# Patient Record
Sex: FEMALE | Race: WHITE | NOT HISPANIC OR LATINO | Employment: OTHER | URBAN - METROPOLITAN AREA
[De-identification: names, ages, dates, MRNs, and addresses within clinical notes are randomized per-mention and may not be internally consistent; named-entity substitution may affect disease eponyms.]

---

## 2017-01-03 ENCOUNTER — TRANSCRIBE ORDERS (OUTPATIENT)
Dept: ADMINISTRATIVE | Facility: HOSPITAL | Age: 72
End: 2017-01-03

## 2017-01-03 DIAGNOSIS — K57.92 ACUTE DIVERTICULITIS: Primary | ICD-10-CM

## 2017-01-05 ENCOUNTER — HOSPITAL ENCOUNTER (OUTPATIENT)
Dept: CT IMAGING | Facility: CLINIC | Age: 72
Discharge: HOME | End: 2017-01-05
Payer: MEDICARE

## 2017-01-05 DIAGNOSIS — K57.92 ACUTE DIVERTICULITIS: ICD-10-CM

## 2017-01-05 PROCEDURE — 74177 CT ABD & PELVIS W/CONTRAST: CPT

## 2017-01-05 RX ADMIN — IOHEXOL 100 ML: 350 INJECTION, SOLUTION INTRAVENOUS at 08:37

## 2017-05-11 ENCOUNTER — APPOINTMENT (OUTPATIENT)
Dept: PHYSICAL THERAPY | Facility: CLINIC | Age: 72
End: 2017-05-11
Payer: MEDICARE

## 2017-05-11 PROCEDURE — 97110 THERAPEUTIC EXERCISES: CPT

## 2017-05-11 PROCEDURE — G8991 OTHER PT/OT GOAL STATUS: HCPCS

## 2017-05-11 PROCEDURE — G8990 OTHER PT/OT CURRENT STATUS: HCPCS

## 2017-05-11 PROCEDURE — 97162 PT EVAL MOD COMPLEX 30 MIN: CPT

## 2017-05-15 ENCOUNTER — APPOINTMENT (OUTPATIENT)
Dept: PHYSICAL THERAPY | Facility: CLINIC | Age: 72
End: 2017-05-15
Payer: MEDICARE

## 2017-05-15 PROCEDURE — 97140 MANUAL THERAPY 1/> REGIONS: CPT

## 2017-05-15 PROCEDURE — 97010 HOT OR COLD PACKS THERAPY: CPT

## 2017-05-18 ENCOUNTER — TRANSCRIBE ORDERS (OUTPATIENT)
Dept: ADMINISTRATIVE | Facility: HOSPITAL | Age: 72
End: 2017-05-18

## 2017-05-18 ENCOUNTER — APPOINTMENT (OUTPATIENT)
Dept: PHYSICAL THERAPY | Facility: CLINIC | Age: 72
End: 2017-05-18
Payer: MEDICARE

## 2017-05-18 DIAGNOSIS — M85.80 OSTEOPENIA, UNSPECIFIED LOCATION: ICD-10-CM

## 2017-05-18 DIAGNOSIS — Z12.31 ENCOUNTER FOR MAMMOGRAM TO ESTABLISH BASELINE MAMMOGRAM: Primary | ICD-10-CM

## 2017-05-19 ENCOUNTER — HOSPITAL ENCOUNTER (OUTPATIENT)
Dept: MAMMOGRAPHY | Facility: CLINIC | Age: 72
Discharge: HOME | End: 2017-05-19
Payer: MEDICARE

## 2017-05-19 DIAGNOSIS — M85.80 OSTEOPENIA, UNSPECIFIED LOCATION: ICD-10-CM

## 2017-05-19 DIAGNOSIS — Z12.31 ENCOUNTER FOR MAMMOGRAM TO ESTABLISH BASELINE MAMMOGRAM: ICD-10-CM

## 2017-05-19 PROCEDURE — G0202 SCR MAMMO BI INCL CAD: HCPCS

## 2017-05-19 PROCEDURE — 77080 DXA BONE DENSITY AXIAL: CPT

## 2017-05-19 PROCEDURE — 77063 BREAST TOMOSYNTHESIS BI: CPT

## 2017-05-22 ENCOUNTER — APPOINTMENT (OUTPATIENT)
Dept: PHYSICAL THERAPY | Facility: CLINIC | Age: 72
End: 2017-05-22
Payer: MEDICARE

## 2017-05-22 PROCEDURE — 97140 MANUAL THERAPY 1/> REGIONS: CPT

## 2017-05-24 ENCOUNTER — APPOINTMENT (OUTPATIENT)
Dept: PHYSICAL THERAPY | Facility: CLINIC | Age: 72
End: 2017-05-24
Payer: MEDICARE

## 2017-05-31 ENCOUNTER — APPOINTMENT (OUTPATIENT)
Dept: PHYSICAL THERAPY | Facility: CLINIC | Age: 72
End: 2017-05-31
Payer: MEDICARE

## 2018-06-11 ENCOUNTER — TRANSCRIBE ORDERS (OUTPATIENT)
Dept: ADMINISTRATIVE | Facility: HOSPITAL | Age: 73
End: 2018-06-11

## 2018-06-11 DIAGNOSIS — Z12.39 SCREENING BREAST EXAMINATION: Primary | ICD-10-CM

## 2018-06-26 ENCOUNTER — HOSPITAL ENCOUNTER (OUTPATIENT)
Dept: MAMMOGRAPHY | Facility: CLINIC | Age: 73
Discharge: HOME | End: 2018-06-26
Payer: MEDICARE

## 2018-06-26 DIAGNOSIS — Z12.39 SCREENING BREAST EXAMINATION: ICD-10-CM

## 2018-06-26 PROCEDURE — 77063 BREAST TOMOSYNTHESIS BI: CPT

## 2018-06-26 PROCEDURE — 77067 SCR MAMMO BI INCL CAD: CPT

## 2018-09-18 ENCOUNTER — TRANSCRIBE ORDERS (OUTPATIENT)
Dept: ADMINISTRATIVE | Facility: HOSPITAL | Age: 73
End: 2018-09-18

## 2018-09-18 DIAGNOSIS — M48.061 SPINAL STENOSIS OF LUMBAR REGION, UNSPECIFIED WHETHER NEUROGENIC CLAUDICATION PRESENT: Primary | ICD-10-CM

## 2018-10-03 ENCOUNTER — HOSPITAL ENCOUNTER (OUTPATIENT)
Dept: RADIOLOGY | Facility: HOSPITAL | Age: 73
Discharge: HOME | End: 2018-10-03
Payer: MEDICARE

## 2018-10-03 ENCOUNTER — HOSPITAL ENCOUNTER (OUTPATIENT)
Dept: MRI IMAGING | Facility: HOSPITAL | Age: 73
Discharge: HOME | End: 2018-10-03
Payer: MEDICARE

## 2018-10-03 DIAGNOSIS — M48.061 SPINAL STENOSIS OF LUMBAR REGION, UNSPECIFIED WHETHER NEUROGENIC CLAUDICATION PRESENT: ICD-10-CM

## 2018-10-03 PROCEDURE — 72148 MRI LUMBAR SPINE W/O DYE: CPT

## 2018-10-03 PROCEDURE — 72114 X-RAY EXAM L-S SPINE BENDING: CPT

## 2019-05-21 ENCOUNTER — TRANSCRIBE ORDERS (OUTPATIENT)
Dept: ADMINISTRATIVE | Facility: HOSPITAL | Age: 74
End: 2019-05-21

## 2019-05-21 ENCOUNTER — APPOINTMENT (OUTPATIENT)
Dept: RADIOLOGY | Facility: CLINIC | Age: 74
End: 2019-05-21
Payer: MEDICARE

## 2019-05-21 DIAGNOSIS — M48.061 SPINAL STENOSIS OF LUMBAR REGION, UNSPECIFIED WHETHER NEUROGENIC CLAUDICATION PRESENT: Primary | ICD-10-CM

## 2019-05-21 DIAGNOSIS — M48.061 SPINAL STENOSIS OF LUMBAR REGION, UNSPECIFIED WHETHER NEUROGENIC CLAUDICATION PRESENT: ICD-10-CM

## 2019-05-21 PROCEDURE — 72114 X-RAY EXAM L-S SPINE BENDING: CPT

## 2019-06-13 ENCOUNTER — TRANSCRIBE ORDERS (OUTPATIENT)
Dept: ADMINISTRATIVE | Facility: HOSPITAL | Age: 74
End: 2019-06-13

## 2019-06-13 DIAGNOSIS — Z12.31 ENCOUNTER FOR SCREENING MAMMOGRAM FOR MALIGNANT NEOPLASM OF BREAST: Primary | ICD-10-CM

## 2019-06-27 ENCOUNTER — HOSPITAL ENCOUNTER (OUTPATIENT)
Dept: MAMMOGRAPHY | Facility: CLINIC | Age: 74
Discharge: HOME | End: 2019-06-27
Payer: MEDICARE

## 2019-06-27 VITALS — BODY MASS INDEX: 23.05 KG/M2 | WEIGHT: 135 LBS | HEIGHT: 64 IN

## 2019-06-27 DIAGNOSIS — Z12.31 ENCOUNTER FOR SCREENING MAMMOGRAM FOR MALIGNANT NEOPLASM OF BREAST: ICD-10-CM

## 2019-06-27 PROCEDURE — 77063 BREAST TOMOSYNTHESIS BI: CPT

## 2019-06-27 PROCEDURE — 77067 SCR MAMMO BI INCL CAD: CPT

## 2019-07-11 ENCOUNTER — TRANSCRIBE ORDERS (OUTPATIENT)
Dept: ADMINISTRATIVE | Facility: HOSPITAL | Age: 74
End: 2019-07-11

## 2019-07-11 DIAGNOSIS — M47.816 LUMBAR SPONDYLOSIS: Primary | ICD-10-CM

## 2019-08-12 ENCOUNTER — EVALUATION (OUTPATIENT)
Dept: PHYSICAL THERAPY | Facility: CLINIC | Age: 74
End: 2019-08-12
Payer: MEDICARE

## 2019-08-12 ENCOUNTER — TRANSCRIBE ORDERS (OUTPATIENT)
Dept: PHYSICAL THERAPY | Facility: CLINIC | Age: 74
End: 2019-08-12

## 2019-08-12 DIAGNOSIS — M47.816 LUMBAR SPONDYLOSIS: Primary | ICD-10-CM

## 2019-08-12 PROCEDURE — 97161 PT EVAL LOW COMPLEX 20 MIN: CPT | Performed by: PHYSICAL THERAPIST

## 2019-08-12 PROCEDURE — 97110 THERAPEUTIC EXERCISES: CPT | Performed by: PHYSICAL THERAPIST

## 2019-08-12 RX ORDER — OMEPRAZOLE 40 MG/1
40 CAPSULE, DELAYED RELEASE ORAL DAILY
COMMUNITY
End: 2020-06-02

## 2019-08-12 RX ORDER — ALPRAZOLAM 1 MG/1
1 TABLET ORAL
COMMUNITY

## 2019-08-12 RX ORDER — OXYCODONE AND ACETAMINOPHEN 10; 325 MG/1; MG/1
1 TABLET ORAL EVERY 4 HOURS PRN
COMMUNITY
End: 2020-05-13

## 2019-08-12 RX ORDER — METOPROLOL SUCCINATE 25 MG/1
25 TABLET, EXTENDED RELEASE ORAL DAILY
COMMUNITY

## 2019-08-12 RX ORDER — DULOXETIN HYDROCHLORIDE 30 MG/1
30 CAPSULE, DELAYED RELEASE ORAL DAILY
COMMUNITY
End: 2020-05-13

## 2019-08-12 NOTE — LETTER
2019    Venus Shanks MD  [address],[place] [address]  [address] [place] [address]    Patient: Sheron Verma   YOB: 1945   Date of Visit: 2019     Encounter Diagnosis     ICD-10-CM    1  Lumbar spondylosis M47 816        Dear Dr Greg Mendoza: Thank you for your recent referral of Sheron Verma  Please review the attached evaluation summary from Ana's recent visit  Please verify that you agree with the plan of care by signing the attached order  If you have any questions or concerns, please do not hesitate to call  I sincerely appreciate the opportunity to share in the care of one of your patients and hope to have another opportunity to work with you in the near future  Sincerely,    Sofya Robles, PT      Referring Provider:      I certify that I have read the below Plan of Care and certify the need for these services furnished under this plan of treatment while under my care  Venus Shanks MD  [address],[place] [address]  [address] [place] [address]  VIA Facsimile: [phone]          PT Evaluation     Today's date: 2019  Patient name: Sheron Verma  : 1945  MRN: [id number]  Referring provider: Pablo Iyer MD  Dx:   Encounter Diagnosis     ICD-10-CM    1  Lumbar spondylosis M47 816                   Assessment  Assessment details: Patient is a 75 y o  female presenting to outpatient physical therapy with chief complaints of lower back pain with (L) radicular symptoms  No further referral appears necessary at this time based upon examination results  Patient describes chronic lower back pain for over 10 years with failed conservative and surgical treatments   Patient displays with abnormal gait, abnormal posture, lumbar ROM restrictions, (+) (L) Slump/ SLR, (L) L4-5 myotomal weakness, extension bias in unweighted positions, and primary movement diagnosis of lumbar hypomobility resulting in pathanatomical signs and symptoms consistent with lumbar spondylosis and functional restrictions  Skilled PT is required to decrease pain and improve strength and ROM to allow patient to return to desired level of function with walking  Please contact me if you have any questions  Thank you for the opportunity to share in the care of this patient  Impairments: abnormal gait, abnormal muscle tone, abnormal or restricted ROM, abnormal movement, activity intolerance, impaired physical strength, lacks appropriate home exercise program, pain with function, poor posture  and poor body mechanics    Symptom irritability: highUnderstanding of Dx/Px/POC: good   Prognosis: fair  Prognosis details: Positive prognostic indicators include: positive attitude toward recovery, motivated to improve  Negative prognostic indicators include chronicity of symptoms  Goals  ST  Decrease pain to 7/10 max in 3 weeks  2  Increase Lumbar AROM: minimal restriction all planes in 3 weeks  3  Increase (B) LE strength by 1/2 MMT grade in 3 weeks  4  I with HEP in 3 weeks  5  Improve FOTO score to 50 in 3 weeks  LT  Decrease pain to 3/10 max in 6 weeks  2  Increase Lumbar AROM: unrestricted all planes in 6 weeks  3  Increase (B) LE strength to 4+/5 all planes in 6 weeks  4  I with HEP in 6 weeks  5  Improve FOTO score to 54 in 6 weeks  Plan  Plan details: Prognosis above is given PT services 2x/week tapering to 1x/week over the next 6 weeks and home program adherence       Patient would benefit from: skilled physical therapy  Planned modality interventions: cryotherapy and thermotherapy: hydrocollator packs  Planned therapy interventions: activity modification, joint mobilization, manual therapy, neuromuscular re-education, patient education, postural training, strengthening, stretching, therapeutic exercise, therapeutic activities, functional ROM exercises, home exercise program, gait training and body mechanics training  Plan of Care beginning date: 2019  Plan of Care expiration date: 2019  Treatment plan discussed with: patient        Subjective Evaluation    History of Present Illness  Mechanism of injury: Patient reports chronic lower back pain with (L) sided radicular symptoms to the (L) ankle  She reports receiving treatments including: physical therapy, injections, chiropractic care, traction, acupuncture, MILD procedure, and lumbar laminectomy surgery  She reports no relief in symptoms with these treatments  She reports in 2018 she had the MILD procedure - referred to neurosurgeon  Neurosurgeon madi Valdivia evaluation and treatment  Greatest concern: won't be able to walk  Quality of life: good    Pain  Current pain ratin  At best pain ratin  At worst pain rating: 10  Location: Lower Lumbar Spine - radiating down (L) LE to ankle   Quality: burning, sharp, radiating and dull ache    Social Support  Lives in: multiple-level home  Lives with: spouse    Employment status: not working  Treatments  Previous treatment: chiropractic, physical therapy, medication and injection treatment  Patient Goals  Patient goal: Return to active lifestyle - walking, decrease pain        Objective     Concurrent Complaints  Positive for history of cancer  Negative for night pain, disturbed sleep, bladder dysfunction, bowel dysfunction, saddle (S4) numbness and history of trauma    Static Posture   General Observations  Symmetrical weight bearing  Lumbar Spine   Flattened       Postural Observations  Seated posture: fair  Standing posture: fair        Neurological Testing     Sensation     Lumbar   Left   Intact: light touch    Right   Intact: light touch    Reflexes   Left   Patellar (L4): normal (2+)  Achilles (S1): normal (2+)    Right   Patellar (L4): normal (2+)  Achilles (S1): normal (2+)    Active Range of Motion     Lumbar   Flexion:  Restriction level: moderate  Extension:  Restriction level: moderate    Additional Active Range of Motion Details  (B) SG: moderate restriction - no pain reproduction     Strength/Myotome Testing     Left Hip   Planes of Motion   Flexion: 4+    Right Hip   Planes of Motion   Flexion: 4+    Left Knee   Flexion: 5  Extension: 5    Right Knee   Flexion: 5  Extension: 5    Left Ankle/Foot   Dorsiflexion: 4  Plantar flexion: 4+  Great toe extension: 4    Right Ankle/Foot   Dorsiflexion: 5  Plantar flexion: 5  Great toe extension: 4+    Tests     Lumbar     Left   Positive passive SLR and slump test    Negative crossed SLR  Right   Negative crossed SLR, passive SLR and slump test      Additional Tests Details  Repeated Movement Testing:   Repeated Flexion in Standing: Increased, Worse  Repeated Extension in Standing: Peripheralizing, Worse  Repeated (R) SG in Standing: NE    Static Position Testing:   Hooklying position: NE  PPT with Hooklying position: Decreased pain  Prone on elbows: Decreased lumbar pain - NE on (L) LE nerve symptoms        Ambulation     Observational Gait   Decreased walking speed     Left foot contact pattern: foot flat  Right foot contact pattern: foot flat  Left arm swing: decreased  Right arm swing: decreased  Base of support: normal    Additional Observational Gait Details  Minimal hip motion with walking         Daily Treatment Diary     DX: Lumbar Spondylosis  EPOC: 9/23/19  Precautions: standard   CO-MORBIDITES: NA  PERSONAL FACTORS:    Manual           Lumbar STM                                                      Exercise Diary  HEP         Prone on Elbows 8/12                   Sciatic Nerve Slider - Seated 8/12                   Recumbent Bike                    Abdominal Bracing 8/12         LFS: Alt UE          LFS: (B) UE          LFS: Alt LE          LFS: [place] UE/LE                    S/L Clamshells 8/12         Bridge with ME Hip ABD                                                                                    Modalities

## 2019-08-12 NOTE — PROGRESS NOTES
PT Evaluation     Today's date: 2019  Patient name: Rashmi Parra  : 1945  MRN: [id number]  Referring provider: Evelyn Lopez MD  Dx:   Encounter Diagnosis     ICD-10-CM    1  Lumbar spondylosis M47 816                   Assessment  Assessment details: Patient is a 75 y o  female presenting to outpatient physical therapy with chief complaints of lower back pain with (L) radicular symptoms  No further referral appears necessary at this time based upon examination results  Patient describes chronic lower back pain for over 10 years with failed conservative and surgical treatments  Patient displays with abnormal gait, abnormal posture, lumbar ROM restrictions, (+) (L) Slump/ SLR, (L) L4-5 myotomal weakness, extension bias in unweighted positions, and primary movement diagnosis of lumbar hypomobility resulting in pathanatomical signs and symptoms consistent with lumbar spondylosis and functional restrictions  Skilled PT is required to decrease pain and improve strength and ROM to allow patient to return to desired level of function with walking  Please contact me if you have any questions  Thank you for the opportunity to share in the care of this patient  Impairments: abnormal gait, abnormal muscle tone, abnormal or restricted ROM, abnormal movement, activity intolerance, impaired physical strength, lacks appropriate home exercise program, pain with function, poor posture  and poor body mechanics    Symptom irritability: highUnderstanding of Dx/Px/POC: good   Prognosis: fair  Prognosis details: Positive prognostic indicators include: positive attitude toward recovery, motivated to improve  Negative prognostic indicators include chronicity of symptoms  Goals  ST  Decrease pain to 7/10 max in 3 weeks  2  Increase Lumbar AROM: minimal restriction all planes in 3 weeks  3  Increase (B) LE strength by 1/2 MMT grade in 3 weeks  4  I with HEP in 3 weeks    5  Improve FOTO score to 50 in 3 weeks  LT  Decrease pain to 3/10 max in 6 weeks  2  Increase Lumbar AROM: unrestricted all planes in 6 weeks  3  Increase (B) LE strength to 4+/5 all planes in 6 weeks  4  I with HEP in 6 weeks  5  Improve FOTO score to 54 in 6 weeks  Plan  Plan details: Prognosis above is given PT services 2x/week tapering to 1x/week over the next 6 weeks and home program adherence  Patient would benefit from: skilled physical therapy  Planned modality interventions: cryotherapy and thermotherapy: hydrocollator packs  Planned therapy interventions: activity modification, joint mobilization, manual therapy, neuromuscular re-education, patient education, postural training, strengthening, stretching, therapeutic exercise, therapeutic activities, functional ROM exercises, home exercise program, gait training and body mechanics training  Plan of Care beginning date: 2019  Plan of Care expiration date: 2019  Treatment plan discussed with: patient        Subjective Evaluation    History of Present Illness  Mechanism of injury: Patient reports chronic lower back pain with (L) sided radicular symptoms to the (L) ankle  She reports receiving treatments including: physical therapy, injections, chiropractic care, traction, acupuncture, MILD procedure, and lumbar laminectomy surgery  She reports no relief in symptoms with these treatments  She reports in 2018 she had the MILD procedure - referred to neurosurgeon  Neurosurgeon recommended Shea evaluation and treatment    Greatest concern: won't be able to walk  Quality of life: good    Pain  Current pain ratin  At best pain ratin  At worst pain rating: 10  Location: Lower Lumbar Spine - radiating down (L) LE to ankle   Quality: burning, sharp, radiating and dull ache    Social Support  Lives in: multiple-level home  Lives with: spouse    Employment status: not working  Treatments  Previous treatment: chiropractic, physical therapy, medication and injection treatment  Patient Goals  Patient goal: Return to active lifestyle - walking, decrease pain        Objective     Concurrent Complaints  Positive for history of cancer  Negative for night pain, disturbed sleep, bladder dysfunction, bowel dysfunction, saddle (S4) numbness and history of trauma    Static Posture   General Observations  Symmetrical weight bearing  Lumbar Spine   Flattened  Postural Observations  Seated posture: fair  Standing posture: fair        Neurological Testing     Sensation     Lumbar   Left   Intact: light touch    Right   Intact: light touch    Reflexes   Left   Patellar (L4): normal (2+)  Achilles (S1): normal (2+)    Right   Patellar (L4): normal (2+)  Achilles (S1): normal (2+)    Active Range of Motion     Lumbar   Flexion:  Restriction level: moderate  Extension:  Restriction level: moderate    Additional Active Range of Motion Details  (B) SG: moderate restriction - no pain reproduction     Strength/Myotome Testing     Left Hip   Planes of Motion   Flexion: 4+    Right Hip   Planes of Motion   Flexion: 4+    Left Knee   Flexion: 5  Extension: 5    Right Knee   Flexion: 5  Extension: 5    Left Ankle/Foot   Dorsiflexion: 4  Plantar flexion: 4+  Great toe extension: 4    Right Ankle/Foot   Dorsiflexion: 5  Plantar flexion: 5  Great toe extension: 4+    Tests     Lumbar     Left   Positive passive SLR and slump test    Negative crossed SLR  Right   Negative crossed SLR, passive SLR and slump test      Additional Tests Details  Repeated Movement Testing:   Repeated Flexion in Standing: Increased, Worse  Repeated Extension in Standing: Peripheralizing, Worse  Repeated (R) SG in Standing: NE    Static Position Testing:   Hooklying position: NE  PPT with Hooklying position: Decreased pain  Prone on elbows: Decreased lumbar pain - NE on (L) LE nerve symptoms        Ambulation     Observational Gait   Decreased walking speed     Left foot contact pattern: foot flat  Right foot contact pattern: foot flat  Left arm swing: decreased  Right arm swing: decreased  Base of support: normal    Additional Observational Gait Details  Minimal hip motion with walking         Daily Treatment Diary     DX: Lumbar Spondylosis  EPOC: 9/23/19  Precautions: standard   CO-MORBIDITES: NA  PERSONAL FACTORS:    Manual           Lumbar STM                                                      Exercise Diary  HEP         Prone on Elbows 8/12                   Sciatic Nerve Slider - Seated 8/12                   Recumbent Bike                    Abdominal Bracing 8/12         LFS: Alt UE          LFS: (B) UE          LFS: Alt LE          LFS: [place] UE/LE                    S/L Clamshells 8/12         Bridge with SD Hip ABD                                                                                    Modalities

## 2019-08-13 ENCOUNTER — OFFICE VISIT (OUTPATIENT)
Dept: PHYSICAL THERAPY | Facility: CLINIC | Age: 74
End: 2019-08-13
Payer: MEDICARE

## 2019-08-13 DIAGNOSIS — M47.816 LUMBAR SPONDYLOSIS: Primary | ICD-10-CM

## 2019-08-13 PROCEDURE — 97110 THERAPEUTIC EXERCISES: CPT | Performed by: PHYSICAL THERAPIST

## 2019-08-13 NOTE — PROGRESS NOTES
Daily Note     Today's date: 2019  Patient name: Annis Severe  : 1945  MRN: [id number]  Referring provider: Hanny Gonzalez MD  Dx:   Encounter Diagnosis     ICD-10-CM    1  Lumbar spondylosis M47 816                   Subjective: Patient reports good tolerance to her IE  She reports she was feeling better until she went shopping at [employer]   She reports her she has been performing her HEP however her symptoms have not decreased  Objective: See treatment diary below      Assessment: Lateral forces were initiated to take pressure off nerve - moving hips to (L) relieved nerve symptoms on (L) side however with progression in range her lower back symptoms were irritated  Patient responded well to manual PA mobilization with decreased lower back discomfort  Instructed patient in sideglide and prone lying with hips to (L)  Patient had difficulty performing bridge exercise - did not progress to HEP  Patient had decreased pain post treatment  Plan: monitor symptom irritability with lateral forces  Progress as able         Daily Treatment Diary     DX: Lumbar Spondylosis  EPOC: 19  Precautions: standard   CO-MORBIDITES: NA  PERSONAL FACTORS:    Manual           Lumbar STM          Lumbar PA Mobs 5 min                                           Exercise Diary  HEP         Prone on Elbows  5 min                  Sciatic Nerve Slider - Seated          Supine Sciatic Nerve slider  10x        Recumbent Bike          Repeated (R) SG - Hips to (L)  10x        Abdominal Bracing  5"x10        LFS: Alt UE  Supine  10x ea        LFS: (B) UE  Supine  10x        LFS: Alt LE          LFS: [place] UE/LE                    S/L Clamshells  OTB  20x        Bridge with TB Hip ABD  OTB  5"x10                  Prone lying with hips shifted (L)  2 min                                                              Modalities

## 2019-08-16 ENCOUNTER — APPOINTMENT (OUTPATIENT)
Dept: PHYSICAL THERAPY | Facility: CLINIC | Age: 74
End: 2019-08-16
Payer: MEDICARE

## 2019-08-16 ENCOUNTER — TRANSCRIBE ORDERS (OUTPATIENT)
Dept: ADMINISTRATIVE | Facility: HOSPITAL | Age: 74
End: 2019-08-16

## 2019-08-16 DIAGNOSIS — M54.16 LUMBAR RADICULOPATHY: Primary | ICD-10-CM

## 2019-08-19 ENCOUNTER — APPOINTMENT (OUTPATIENT)
Dept: PHYSICAL THERAPY | Facility: CLINIC | Age: 74
End: 2019-08-19
Payer: MEDICARE

## 2019-08-22 ENCOUNTER — OFFICE VISIT (OUTPATIENT)
Dept: PHYSICAL THERAPY | Facility: CLINIC | Age: 74
End: 2019-08-22
Payer: MEDICARE

## 2019-08-22 DIAGNOSIS — M47.816 LUMBAR SPONDYLOSIS: Primary | ICD-10-CM

## 2019-08-22 PROCEDURE — 97110 THERAPEUTIC EXERCISES: CPT | Performed by: PHYSICAL THERAPIST

## 2019-08-22 NOTE — PROGRESS NOTES
Daily Note     Today's date: 2019  Patient name: Annis Severe  : 1945  MRN: [id number]  Referring provider: Hanny Gonzalez MD  Dx:   Encounter Diagnosis     ICD-10-CM    1  Lumbar spondylosis M47 816                   Subjective: Patient reports since her LV she has experienced increased pain with prone lying with hips off centered and repeated movements  She reports stopping repeated movements and continuing nerve slider  She notes decreased pain with performance of aquatic exercises  Objective: See treatment diary below      Assessment: Treatment modified secondary to late arrival   Exercises modified secondary to response to treatment  Repeated movements discontinued at this time  Treatment included nerve slider, clamshells, and abdominal bracing  Patient had no complaints post treatment  Plan: Monitor symptom irritability with new HEP and aquatic exercises          Daily Treatment Diary     DX: Lumbar Spondylosis  EPOC: 19  Precautions: standard   CO-MORBIDITES: NA  PERSONAL FACTORS:    Manual           Lumbar STM          Lumbar PA Mobs 5 min                                           Exercise Diary  HEP        Prone on Elbows  5 min                  Sciatic Nerve Slider - Seated   10x       Supine Sciatic Nerve slider  10x 15x       Recumbent Bike          Repeated (R) SG - Hips to (L)  10x        Abdominal Bracing  5"x10 5"x10       LFS: Alt UE  Supine  10x ea        LFS: (B) UE  Supine  10x        LFS: Alt LE          LFS: [place] UE/LE                    S/L Clamshells  OTB  20x OTB  20x       Bridge with TB Hip ABD  OTB  5"x10                  Prone lying with hips shifted (L)  2 min                                                              Modalities

## 2019-08-27 ENCOUNTER — OFFICE VISIT (OUTPATIENT)
Dept: PHYSICAL THERAPY | Facility: CLINIC | Age: 74
End: 2019-08-27
Payer: MEDICARE

## 2019-08-27 ENCOUNTER — HOSPITAL ENCOUNTER (OUTPATIENT)
Dept: MRI IMAGING | Facility: HOSPITAL | Age: 74
Discharge: HOME | End: 2019-08-27
Payer: MEDICARE

## 2019-08-27 DIAGNOSIS — M47.816 LUMBAR SPONDYLOSIS: Primary | ICD-10-CM

## 2019-08-27 DIAGNOSIS — M54.16 LUMBAR RADICULOPATHY: ICD-10-CM

## 2019-08-27 PROCEDURE — 97110 THERAPEUTIC EXERCISES: CPT | Performed by: PHYSICAL THERAPIST

## 2019-08-27 PROCEDURE — 72148 MRI LUMBAR SPINE W/O DYE: CPT

## 2019-08-27 NOTE — PROGRESS NOTES
Daily Note     Today's date: 2019  Patient name: Philip Calvillo  : 1945  MRN: [id number]  Referring provider: Eva Tristan MD  Dx:   Encounter Diagnosis     ICD-10-CM    1  Lumbar spondylosis M47 816                   Subjective: Patient reports no irritation of symptoms following her LV  She notes over the weekend she had increased discomfort - plausibly caused by aquatic exercise progressions  Objective: See treatment diary below      Assessment: Treatment modified secondary to late arrival    Explained rationale for aquatic exercises and progressions/ regressions with resistance  Patient was progressed with hooklying abdominal/ hip exercises with good form and control  She had no complaints of increased pain post treatment  Plan: Monitor symptom irritability with new HEP and aquatic exercises          Daily Treatment Diary     DX: Lumbar Spondylosis  EPOC: 19  Precautions: standard   CO-MORBIDITES: NA  PERSONAL FACTORS:    Manual           Lumbar STM          Lumbar PA Mobs 5 min                                           Exercise Diary  HEP       Prone on Elbows  5 min                  Sciatic Nerve Slider - Seated   10x 15x      Supine Sciatic Nerve slider  10x 15x 15x      Recumbent Bike          Repeated (R) SG - Hips to (L)  10x        Abdominal Bracing  5"x10 5"x10 5"x10      LFS: Alt UE  Supine  10x ea  15x ea      LFS: (B) UE  Supine  10x  15x ea      LFS: Alt LE          LFS: [place] UE/LE                    S/L Clamshells  OTB  20x OTB  20x OTB  2x10 ea      Bridge with TB Hip ABD  OTB  5"x10                  Prone lying with hips shifted (L)  2 min                  H/L Hip ABD Isometric    5"x10      H/L Hip ADD Isometric     5"x10                              Modalities

## 2019-08-29 ENCOUNTER — APPOINTMENT (OUTPATIENT)
Dept: PHYSICAL THERAPY | Facility: CLINIC | Age: 74
End: 2019-08-29
Payer: MEDICARE

## 2019-09-09 ENCOUNTER — EVALUATION (OUTPATIENT)
Dept: PHYSICAL THERAPY | Facility: CLINIC | Age: 74
End: 2019-09-09
Payer: MEDICARE

## 2019-09-09 DIAGNOSIS — M47.816 LUMBAR SPONDYLOSIS: Primary | ICD-10-CM

## 2019-09-09 PROCEDURE — 97110 THERAPEUTIC EXERCISES: CPT | Performed by: PHYSICAL THERAPIST

## 2019-09-09 NOTE — PROGRESS NOTES
Daily Note     Today's date: 2019  Patient name: Gale Regan  : 1945  MRN: [id number]  Referring provider: Татьяна Johnson MD  Dx:   Encounter Diagnosis     ICD-10-CM    1  Lumbar spondylosis M47 816                   Subjective: Patient reports good overall tolerance to her LV  She reports she continues to have pain and tingling - variable in intensity - consistently worse with walking  She notes she has not been able to perform aquatic exercise recently secondary to schedule  She has changed her medication - no longer taking Cymbalta but is taking Zoloft  Overall she sees some improvement however, she is unsure if this is due to exercises or medication change  She reports having an epidural injection about 2 hours prior to her PT appt  today  Objective: See treatment diary below      Assessment: Exercises were modified secondary to epidural injection today  Patient demonstrated good exercise form and recall - no cuing required  Discussed long-term outlook for her condition and progress she is making  She was encouraged to get a second opinion at [facility] and continue with exercises as able  Plan: Continue per plan of care   Progress as tolerated      Daily Treatment Diary     DX: Lumbar Spondylosis  EPOC: 19  Precautions: standard   CO-MORBIDITES: NA  PERSONAL FACTORS:    Manual           Lumbar STM          Lumbar PA Mobs 5 min                                           Exercise Diary  HEP      Prone on Elbows  5 min                  Sciatic Nerve Slider - Seated   10x 15x      Supine Sciatic Nerve slider  10x 15x 15x 15x     Recumbent Bike          Repeated (R) SG - Hips to (L)  10x        Abdominal Bracing  5"x10 5"x10 5"x10 5"x10     LFS: Alt UE  Supine  10x ea  15x ea 15x ea     LFS: (B) UE  Supine  10x  15x ea 15x ea     LFS: Alt LE          LFS: [place] UE/LE                    S/L Clamshells  OTB  20x OTB  20x OTB  2x10 ea Bridge with TB Hip ABD  OTB  5"x10                  Prone lying with hips shifted (L)  2 min                  H/L Hip ABD Isometric    5"x10 5"x10     H/L Hip ADD Isometric     5"x10 5"x10                             Modalities

## 2019-09-23 ENCOUNTER — APPOINTMENT (OUTPATIENT)
Dept: PHYSICAL THERAPY | Facility: CLINIC | Age: 74
End: 2019-09-23
Payer: MEDICARE

## 2019-10-17 ENCOUNTER — TRANSCRIBE ORDERS (OUTPATIENT)
Dept: ADMINISTRATIVE | Facility: HOSPITAL | Age: 74
End: 2019-10-17

## 2019-10-17 DIAGNOSIS — M89.9 BONE DISORDER: Primary | ICD-10-CM

## 2019-12-09 ENCOUNTER — HOSPITAL ENCOUNTER (OUTPATIENT)
Dept: MAMMOGRAPHY | Facility: CLINIC | Age: 74
Discharge: HOME | End: 2019-12-09
Payer: MEDICARE

## 2019-12-09 DIAGNOSIS — M89.9 BONE DISORDER: ICD-10-CM

## 2019-12-09 PROCEDURE — 77080 DXA BONE DENSITY AXIAL: CPT

## 2020-05-13 ENCOUNTER — TELEMEDICINE (OUTPATIENT)
Dept: ENDOCRINOLOGY | Facility: CLINIC | Age: 75
End: 2020-05-13
Payer: MEDICARE

## 2020-05-13 DIAGNOSIS — M81.0 OSTEOPOROSIS, UNSPECIFIED OSTEOPOROSIS TYPE, UNSPECIFIED PATHOLOGICAL FRACTURE PRESENCE: ICD-10-CM

## 2020-05-13 DIAGNOSIS — R00.0 TACHYCARDIA: ICD-10-CM

## 2020-05-13 DIAGNOSIS — M81.8 OTHER OSTEOPOROSIS WITHOUT CURRENT PATHOLOGICAL FRACTURE: Primary | ICD-10-CM

## 2020-05-13 PROCEDURE — 99203 OFFICE O/P NEW LOW 30 MIN: CPT | Performed by: INTERNAL MEDICINE

## 2020-05-26 ENCOUNTER — TELEPHONE (OUTPATIENT)
Dept: ENDOCRINOLOGY | Facility: CLINIC | Age: 75
End: 2020-05-26

## 2020-05-26 LAB
25(OH)D3+25(OH)D2 SERPL-MCNC: 39.2 NG/ML (ref 30–100)
ALBUMIN MFR UR ELPH: 38 %
ALBUMIN SERPL ELPH-MCNC: 4.1 G/DL (ref 2.9–4.4)
ALBUMIN SERPL-MCNC: 4.6 G/DL (ref 3.7–4.7)
ALBUMIN/GLOB SERPL: 1.6 {RATIO} (ref 0.7–1.7)
ALBUMIN/GLOB SERPL: 2.2 {RATIO} (ref 1.2–2.2)
ALP SERPL-CCNC: 78 IU/L (ref 39–117)
ALPHA1 GLOB MFR UR ELPH: 3.3 %
ALPHA1 GLOB SERPL ELPH-MCNC: 0.2 G/DL (ref 0–0.4)
ALPHA2 GLOB MFR UR ELPH: 15.1 %
ALPHA2 GLOB SERPL ELPH-MCNC: 0.8 G/DL (ref 0.4–1)
ALT SERPL-CCNC: 15 IU/L (ref 0–32)
AST SERPL-CCNC: 22 IU/L (ref 0–40)
B-GLOBULIN MFR UR ELPH: 22.1 %
B-GLOBULIN SERPL ELPH-MCNC: 1 G/DL (ref 0.7–1.3)
BILIRUB SERPL-MCNC: <0.2 MG/DL (ref 0–1.2)
BUN SERPL-MCNC: 19 MG/DL (ref 8–27)
BUN/CREAT SERPL: 23 (ref 12–28)
CALCIUM SERPL-MCNC: 9.5 MG/DL (ref 8.7–10.3)
CHLORIDE SERPL-SCNC: 99 MMOL/L (ref 96–106)
CO2 SERPL-SCNC: 26 MMOL/L (ref 20–29)
CREAT SERPL-MCNC: 0.82 MG/DL (ref 0.57–1)
GAMMA GLOB MFR UR ELPH: 21.5 %
GAMMA GLOB SERPL ELPH-MCNC: 0.6 G/DL (ref 0.4–1.8)
GLOBULIN SER CALC-MCNC: 2.6 G/DL (ref 2.2–3.9)
GLOBULIN SER-MCNC: 2.1 G/DL (ref 1.5–4.5)
GLUCOSE SERPL-MCNC: 92 MG/DL (ref 65–99)
LABORATORY COMMENT REPORT: NORMAL
LABORATORY COMMENT REPORT: NORMAL
M PROTEIN MFR UR ELPH: NORMAL %
M PROTEIN SERPL ELPH-MCNC: NORMAL G/DL
POTASSIUM SERPL-SCNC: 4.7 MMOL/L (ref 3.5–5.2)
PROT SERPL-MCNC: 6.7 G/DL (ref 6–8.5)
PROT UR-MCNC: 7.7 MG/DL
PTH-INTACT SERPL-MCNC: 26 PG/ML (ref 15–65)
SL AMB EGFR AFRICAN AMERICAN: 81 ML/MIN/1.73
SL AMB EGFR NON AFRICAN AMERICAN: 70 ML/MIN/1.73
SODIUM SERPL-SCNC: 138 MMOL/L (ref 134–144)
T4 FREE SERPL-MCNC: 0.72 NG/DL (ref 0.82–1.77)
TSH SERPL DL<=0.005 MIU/L-ACNC: 1.12 UIU/ML (ref 0.45–4.5)

## 2020-05-28 ENCOUNTER — TELEPHONE (OUTPATIENT)
Dept: ENDOCRINOLOGY | Facility: CLINIC | Age: 75
End: 2020-05-28

## 2020-06-01 ENCOUNTER — TELEPHONE (OUTPATIENT)
Dept: ENDOCRINOLOGY | Facility: CLINIC | Age: 75
End: 2020-06-01

## 2020-06-02 ENCOUNTER — TELEPHONE (OUTPATIENT)
Dept: ENDOCRINOLOGY | Facility: CLINIC | Age: 75
End: 2020-06-02

## 2020-06-02 ENCOUNTER — OFFICE VISIT (OUTPATIENT)
Dept: ENDOCRINOLOGY | Facility: CLINIC | Age: 75
End: 2020-06-02
Payer: MEDICARE

## 2020-06-02 VITALS
BODY MASS INDEX: 22.36 KG/M2 | SYSTOLIC BLOOD PRESSURE: 110 MMHG | HEART RATE: 64 BPM | DIASTOLIC BLOOD PRESSURE: 80 MMHG | HEIGHT: 64 IN | WEIGHT: 131 LBS | TEMPERATURE: 96.6 F

## 2020-06-02 DIAGNOSIS — R94.6 ABNORMAL THYROID FUNCTION TEST: ICD-10-CM

## 2020-06-02 DIAGNOSIS — M81.8 OTHER OSTEOPOROSIS WITHOUT CURRENT PATHOLOGICAL FRACTURE: Primary | ICD-10-CM

## 2020-06-02 PROCEDURE — 99214 OFFICE O/P EST MOD 30 MIN: CPT | Performed by: INTERNAL MEDICINE

## 2020-06-03 DIAGNOSIS — M81.0 OSTEOPOROSIS, UNSPECIFIED OSTEOPOROSIS TYPE, UNSPECIFIED PATHOLOGICAL FRACTURE PRESENCE: ICD-10-CM

## 2020-06-03 DIAGNOSIS — M81.8 OTHER OSTEOPOROSIS WITHOUT CURRENT PATHOLOGICAL FRACTURE: Primary | ICD-10-CM

## 2020-06-11 ENCOUNTER — TELEPHONE (OUTPATIENT)
Dept: ENDOCRINOLOGY | Facility: CLINIC | Age: 75
End: 2020-06-11

## 2020-06-18 ENCOUNTER — CLINICAL SUPPORT (OUTPATIENT)
Dept: ENDOCRINOLOGY | Facility: CLINIC | Age: 75
End: 2020-06-18
Payer: MEDICARE

## 2020-06-18 DIAGNOSIS — M81.8 OTHER OSTEOPOROSIS WITHOUT CURRENT PATHOLOGICAL FRACTURE: ICD-10-CM

## 2020-06-18 DIAGNOSIS — M81.8 OTHER OSTEOPOROSIS WITHOUT CURRENT PATHOLOGICAL FRACTURE: Primary | ICD-10-CM

## 2020-06-18 PROCEDURE — 96372 THER/PROPH/DIAG INJ SC/IM: CPT

## 2020-08-17 ENCOUNTER — EVALUATION (OUTPATIENT)
Dept: PHYSICAL THERAPY | Facility: CLINIC | Age: 75
End: 2020-08-17
Payer: MEDICARE

## 2020-08-17 ENCOUNTER — TRANSCRIBE ORDERS (OUTPATIENT)
Dept: PHYSICAL THERAPY | Facility: CLINIC | Age: 75
End: 2020-08-17

## 2020-08-17 DIAGNOSIS — M25.551 RIGHT HIP PAIN: Primary | ICD-10-CM

## 2020-08-17 DIAGNOSIS — M54.50 LUMBAR PAIN: ICD-10-CM

## 2020-08-17 DIAGNOSIS — Z98.1 S/P LUMBAR FUSION: ICD-10-CM

## 2020-08-17 PROCEDURE — 97161 PT EVAL LOW COMPLEX 20 MIN: CPT | Performed by: PHYSICAL THERAPIST

## 2020-08-17 PROCEDURE — 97140 MANUAL THERAPY 1/> REGIONS: CPT | Performed by: PHYSICAL THERAPIST

## 2020-08-17 RX ORDER — OMEPRAZOLE 40 MG/1
40 CAPSULE, DELAYED RELEASE ORAL DAILY
COMMUNITY
End: 2021-12-14 | Stop reason: DRUGHIGH

## 2020-08-17 NOTE — PROGRESS NOTES
PT Evaluation     Today's date: 2020  Patient name: Neftaly Terry  : 1945  MRN: [id number]  Referring provider: Lucian Marlow MD  Dx:   Encounter Diagnosis     ICD-10-CM    1  Right hip pain  M25 551    2  Lumbar pain  M54 5    3  S/P lumbar fusion  Z98 1                   Assessment  Assessment details: Neftaly Terry is a 75 y o  female who presents with pain, decreased strength, decreased ROM, decreased joint mobility, ambulatory dysfunction and postural  dysfunction  Due to these impairments, patient has difficulty performing ADL's, recreational activities, engaging in social activities, stair negotiation, lifting/carrying  Patient's clinical presentation is consistent with their referring diagnosis of right hip pain, and s/p l/s fusion  Patient has been educated in home exercise program and plan of care  Patient would benefit from skilled physical therapy services to address their aforementioned functional limitations and progress towards prior level of function and independence with home exercise program      Impairments: abnormal muscle firing, abnormal or restricted ROM, activity intolerance, impaired physical strength, lacks appropriate home exercise program, pain with function and poor body mechanics  Understanding of Dx/Px/POC: good   Prognosis: good    Goals  Short Term Goals: Target Date 30 days  1  Initiate and advance HEP  2  Decrease c/o pain to 4/10 at worst  3  Increase ROM to WNL  4  Increase strength by 1 grade    Long Term Goals: Target Date 90 days  1  Indep with HEP  2  Abolish c/o pain   3   Increase strength to [address]  Patient would benefit from: skilled PT  Planned modality interventions: cryotherapy  Planned therapy interventions: joint mobilization, manual therapy, patient education, postural training, activity modification, abdominal trunk stabilization, body mechanics training, flexibility, functional ROM exercises, graded exercise, home exercise program, neuromuscular re-education, strengthening, stretching, therapeutic activities, therapeutic exercise, motor coordination training, muscle pump exercises, gait training, balance/weight bearing training and ADL training  Frequency: 1x week  Duration in weeks: 6  Plan of Care beginning date: 2020  Plan of Care expiration date: 10/12/2020  Treatment plan discussed with: patient        Subjective Evaluation    History of Present Illness  Mechanism of injury: Pt notes that she has had a hx of l/s pain, as well as two surgeries on her back (laminectomy , and fusion 3/3/2020)  Pt notes that her back felt great since surgery and she started back to a walking program  A few weeks ago she was sitting for about 4 hours in a low chair, and had pain after getting up to standing  Initially her pain was improved with walking but is now always there to some degree  Worst position is standing and walking  Best position is supine  Pt denies any l/s pain, pt denies any n/t in the extremities  Pt also notes a long hx with PT, with minimal success through out her tx's  Pain  Current pain ratin  At best pain ratin  At worst pain ratin  Location: right lateral hip  Quality: dull ache, discomfort and tight    Patient Goals  Patient goals for therapy: decreased pain, increased motion, independence with ADLs/IADLs, increased strength and return to sport/leisure activities          Objective     Concurrent Complaints  Positive for night pain (normal pain in righ tlateral hip, but decreased from standing)  Negative for disturbed sleep, bladder dysfunction, bowel dysfunction, saddle (S4) numbness, cardiac problem, kidney problem, gallbladder problem, stomach problem, ulcer, appendix problem, spleen problem, pancreas problem, history of cancer, history of trauma and infection    Palpation     Right   Tenderness of the gluteus blas and gluteus medius       Tenderness     Right Hip   Tenderness in the greater trochanter  Active Range of Motion   Left Hip   Normal active range of motion    Right Hip   Normal active range of motion    Strength/Myotome Testing     Left Hip   Planes of Motion   Flexion: 4  Extension: 4-  Abduction: 4-  Adduction: 4    Right Hip   Planes of Motion   Flexion: 4  Extension: 3+  Abduction: 3+  Adduction: 4    Left Knee   Flexion: 4-  Extension: 4+    Right Knee   Extension: 4+    Left Ankle/Foot   Dorsiflexion: 4+  Plantar flexion: 4    Right Ankle/Foot   Dorsiflexion: 4+  Plantar flexion: 4    Muscle Activation   Patient able to activate left external obliques, left internal obliques, left multifidus, right external obliques, right internal obliques and right multifidus  Patient unable to activate left transverse abdominals and right transverse abdominals  Tests     Left Hip   Negative Ely's and Dawson Rivera: Negative  90/90 SLR: Negative  Right Hip   Positive Ely's Nabil Rivera: Negative  90/90 SLR: Negative  Ambulation     Observational Gait   Gait: antalgic and asymmetric   Increased left stance time and right swing time  Decreased walking speed, stride length, right stance time and left swing time     Base of support: decreased      Flowsheet Rows      Most Recent Value   PT/OT G-Codes   Current Score  65   Projected Score  74             Precautions: DOS l/s fusion  3/3/2020      Manuals 8/17                                                                Neuro Re-Ed             ppt 5''x10            Ppt hip add 5''x10            Ppt hip abd gtb 5''x10            Ppt slr x10 nv            Supine bridge gtb nv                                      Ther Ex             S/l hip abd x10            obers stretch 15''x3            bike nv            Side step up nv lv 2                                                                Ther Activity                                       Gait Training                                       Modalities

## 2020-08-17 NOTE — LETTER
2020    Joe Tong MD  [address]    Patient: Cora Granger   YOB: 1945   Date of Visit: 2020     Encounter Diagnosis     ICD-10-CM    1  Right hip pain  M25 551    2  Lumbar pain  M54 5    3  S/P lumbar fusion  Z98 1        Dear Dr Gela Mcgovern:    Thank you for your recent referral of Cora Granger  Please review the attached evaluation summary from Ana's recent visit  Please verify that you agree with the plan of care by signing the attached order  If you have any questions or concerns, please do not hesitate to call  I sincerely appreciate the opportunity to share in the care of one of your patients and hope to have another opportunity to work with you in the near future  Sincerely,    Elidia Garcia, PT      Referring Provider:      I certify that I have read the below Plan of Care and certify the need for these services furnished under this plan of treatment while under my care  Joe Tong MD  [address] [place], [place] [id number]  VIA Facsimile: [phone]          PT Evaluation     Today's date: 2020  Patient name: Cora Granger  : 1945  MRN: [id number]  Referring provider: Aldair Marcial MD  Dx:   Encounter Diagnosis     ICD-10-CM    1  Right hip pain  M25 551    2  Lumbar pain  M54 5    3  S/P lumbar fusion  Z98 1                   Assessment  Assessment details: Cora Granger is a 75 y o  female who presents with pain, decreased strength, decreased ROM, decreased joint mobility, ambulatory dysfunction and postural  dysfunction  Due to these impairments, patient has difficulty performing ADL's, recreational activities, engaging in social activities, stair negotiation, lifting/carrying  Patient's clinical presentation is consistent with their referring diagnosis of right hip pain, and s/p l/s fusion   Patient has been educated in home exercise program and plan of care  Patient would benefit from skilled physical therapy services to address their aforementioned functional limitations and progress towards prior level of function and independence with home exercise program      Impairments: abnormal muscle firing, abnormal or restricted ROM, activity intolerance, impaired physical strength, lacks appropriate home exercise program, pain with function and poor body mechanics  Understanding of Dx/Px/POC: good   Prognosis: good    Goals  Short Term Goals: Target Date 30 days  1  Initiate and advance HEP  2  Decrease c/o pain to 4/10 at worst  3  Increase ROM to WNL  4  Increase strength by 1 grade    Long Term Goals: Target Date 90 days  1  Indep with HEP  2  Abolish c/o pain   3  Increase strength to [address]  Patient would benefit from: skilled PT  Planned modality interventions: cryotherapy  Planned therapy interventions: joint mobilization, manual therapy, patient education, postural training, activity modification, abdominal trunk stabilization, body mechanics training, flexibility, functional ROM exercises, graded exercise, home exercise program, neuromuscular re-education, strengthening, stretching, therapeutic activities, therapeutic exercise, motor coordination training, muscle pump exercises, gait training, balance/weight bearing training and ADL training  Frequency: 1x week  Duration in weeks: 6  Plan of Care beginning date: 8/17/2020  Plan of Care expiration date: 10/12/2020  Treatment plan discussed with: patient        Subjective Evaluation    History of Present Illness  Mechanism of injury: Pt notes that she has had a hx of l/s pain, as well as two surgeries on her back (laminectomy 2015, and fusion 3/3/2020)  Pt notes that her back felt great since surgery and she started back to a walking program  A few weeks ago she was sitting for about 4 hours in a low chair, and had pain after getting up to standing   Initially her pain was improved with walking but is now always there to some degree  Worst position is standing and walking  Best position is supine  Pt denies any l/s pain, pt denies any n/t in the extremities  Pt also notes a long hx with PT, with minimal success through out her tx's  Pain  Current pain ratin  At best pain ratin  At worst pain ratin  Location: right lateral hip  Quality: dull ache, discomfort and tight    Patient Goals  Patient goals for therapy: decreased pain, increased motion, independence with ADLs/IADLs, increased strength and return to sport/leisure activities          Objective     Concurrent Complaints  Positive for night pain (normal pain in righ tlateral hip, but decreased from standing)  Negative for disturbed sleep, bladder dysfunction, bowel dysfunction, saddle (S4) numbness, cardiac problem, kidney problem, gallbladder problem, stomach problem, ulcer, appendix problem, spleen problem, pancreas problem, history of cancer, history of trauma and infection    Palpation     Right   Tenderness of the gluteus blas and gluteus medius  Tenderness     Right Hip   Tenderness in the greater trochanter  Active Range of Motion   Left Hip   Normal active range of motion    Right Hip   Normal active range of motion    Strength/Myotome Testing     Left Hip   Planes of Motion   Flexion: 4  Extension: 4-  Abduction: 4-  Adduction: 4    Right Hip   Planes of Motion   Flexion: 4  Extension: 3+  Abduction: 3+  Adduction: 4    Left Knee   Flexion: 4-  Extension: 4+    Right Knee   Extension: 4+    Left Ankle/Foot   Dorsiflexion: 4+  Plantar flexion: 4    Right Ankle/Foot   Dorsiflexion: 4+  Plantar flexion: 4    Muscle Activation   Patient able to activate left external obliques, left internal obliques, left multifidus, right external obliques, right internal obliques and right multifidus  Patient unable to activate left transverse abdominals and right transverse abdominals  Tests     Left Hip   Negative Ely's and Dawson  Miguel: Negative  90/90 SLR: Negative  Right Hip   Positive Ely's and Dawson  Miguel: Negative  90/90 SLR: Negative  Ambulation     Observational Gait   Gait: antalgic and asymmetric   Increased left stance time and right swing time  Decreased walking speed, stride length, right stance time and left swing time     Base of support: decreased      Flowsheet Rows      Most Recent Value   PT/OT G-Codes   Current Score  65   Projected Score  74             Precautions: DOS l/s fusion  3/3/2020      Manuals 8/17                                                                Neuro Re-Ed             ppt 5''x10            Ppt hip add 5''x10            Ppt hip abd gtb 5''x10            Ppt slr x10 nv            Supine bridge gtb nv                                      Ther Ex             S/l hip abd x10            obers stretch 15''x3            bike nv            Side step up nv lv 2                                                                Ther Activity                                       Gait Training                                       Modalities

## 2020-08-24 ENCOUNTER — APPOINTMENT (OUTPATIENT)
Dept: PHYSICAL THERAPY | Facility: CLINIC | Age: 75
End: 2020-08-24
Payer: MEDICARE

## 2020-09-04 ENCOUNTER — OFFICE VISIT (OUTPATIENT)
Dept: PHYSICAL THERAPY | Facility: CLINIC | Age: 75
End: 2020-09-04
Payer: MEDICARE

## 2020-09-04 DIAGNOSIS — M54.50 LUMBAR PAIN: ICD-10-CM

## 2020-09-04 DIAGNOSIS — Z98.1 S/P LUMBAR FUSION: ICD-10-CM

## 2020-09-04 DIAGNOSIS — M25.551 RIGHT HIP PAIN: Primary | ICD-10-CM

## 2020-09-04 PROCEDURE — 97110 THERAPEUTIC EXERCISES: CPT | Performed by: PHYSICAL THERAPIST

## 2020-09-04 PROCEDURE — 97140 MANUAL THERAPY 1/> REGIONS: CPT | Performed by: PHYSICAL THERAPIST

## 2020-09-04 NOTE — PROGRESS NOTES
Daily Note     Today's date: 2020  Patient name: Joe Vazquez  : 1945  MRN: [id number]  Referring provider: Servando Islas MD  Dx:   Encounter Diagnosis     ICD-10-CM    1  Right hip pain  M25 551    2  Lumbar pain  M54 5    3  S/P lumbar fusion  Z98 1                   Subjective: Pt notes that she has been having increased pain in the right hip with s/l hip abd and hip abd stretch  Stopped them for 4 day, and still had pain  Objective: See treatment diary below      Assessment: adjusted s/l hip abd to clamshells, and s/l hip abd stretch with leg extended to knee bent to decrease lever arm on the hip musculature  Pt was able to complete these with no increase in pain  Plan: Continue per plan of care  pt called to cancel all appts as pain levels were increasing   D/c at this time  Precautions: DOS l/s fusion  3/3/2020      Manuals  9/4           Right gluteal stm  DB                                                  Neuro Re-Ed             ppt 5''x10            Ppt hip add 5''x10            Ppt hip abd gtb 5''x10            Ppt slr x10 nv            Supine bridge gtb nv                                      Ther Ex             S/l hip abd x10 clamshells red 5''x10           obers stretch 15''x3 Knee bent 10''x3           bike nv            Side step up nv lv 2                                                                Ther Activity                                       Gait Training                                       Modalities
DISCHARGE

## 2020-09-09 ENCOUNTER — TRANSCRIBE ORDERS (OUTPATIENT)
Dept: ADMINISTRATIVE | Facility: HOSPITAL | Age: 75
End: 2020-09-09

## 2020-09-09 DIAGNOSIS — Z12.31 ENCOUNTER FOR SCREENING MAMMOGRAM FOR MALIGNANT NEOPLASM OF BREAST: Primary | ICD-10-CM

## 2020-09-11 ENCOUNTER — TRANSCRIBE ORDERS (OUTPATIENT)
Dept: ADMINISTRATIVE | Facility: HOSPITAL | Age: 75
End: 2020-09-11

## 2020-09-11 ENCOUNTER — APPOINTMENT (OUTPATIENT)
Dept: RADIOLOGY | Facility: CLINIC | Age: 75
End: 2020-09-11
Payer: MEDICARE

## 2020-09-11 DIAGNOSIS — M25.551 RIGHT HIP PAIN: Primary | ICD-10-CM

## 2020-09-11 DIAGNOSIS — M25.551 RIGHT HIP PAIN: ICD-10-CM

## 2020-09-11 PROCEDURE — 73502 X-RAY EXAM HIP UNI 2-3 VIEWS: CPT

## 2020-09-17 ENCOUNTER — APPOINTMENT (OUTPATIENT)
Dept: PHYSICAL THERAPY | Facility: CLINIC | Age: 75
End: 2020-09-17
Payer: MEDICARE

## 2020-09-25 ENCOUNTER — HOSPITAL ENCOUNTER (OUTPATIENT)
Dept: MAMMOGRAPHY | Facility: CLINIC | Age: 75
Discharge: HOME | End: 2020-09-25
Payer: MEDICARE

## 2020-09-25 VITALS — BODY MASS INDEX: 22.36 KG/M2 | WEIGHT: 131 LBS | HEIGHT: 64 IN

## 2020-09-25 DIAGNOSIS — Z12.31 ENCOUNTER FOR SCREENING MAMMOGRAM FOR MALIGNANT NEOPLASM OF BREAST: ICD-10-CM

## 2020-09-25 PROCEDURE — 77067 SCR MAMMO BI INCL CAD: CPT

## 2020-09-25 PROCEDURE — 77063 BREAST TOMOSYNTHESIS BI: CPT

## 2020-10-30 ENCOUNTER — TRANSCRIBE ORDERS (OUTPATIENT)
Dept: ADMINISTRATIVE | Facility: HOSPITAL | Age: 75
End: 2020-10-30

## 2020-10-30 DIAGNOSIS — M48.062 SPINAL STENOSIS, LUMBAR REGION WITH NEUROGENIC CLAUDICATION: Primary | ICD-10-CM

## 2020-11-12 ENCOUNTER — HOSPITAL ENCOUNTER (OUTPATIENT)
Dept: MRI IMAGING | Facility: HOSPITAL | Age: 75
Discharge: HOME | End: 2020-11-12
Payer: MEDICARE

## 2020-11-12 DIAGNOSIS — M48.062 SPINAL STENOSIS, LUMBAR REGION WITH NEUROGENIC CLAUDICATION: ICD-10-CM

## 2020-11-12 PROCEDURE — G1004 CDSM NDSC: HCPCS

## 2020-11-12 PROCEDURE — 73721 MRI JNT OF LWR EXTRE W/O DYE: CPT

## 2020-12-02 ENCOUNTER — TELEPHONE (OUTPATIENT)
Dept: ENDOCRINOLOGY | Facility: CLINIC | Age: 75
End: 2020-12-02

## 2020-12-07 LAB — HBA1C MFR BLD HPLC: 5.7 %

## 2020-12-18 ENCOUNTER — CLINICAL SUPPORT (OUTPATIENT)
Dept: ENDOCRINOLOGY | Facility: CLINIC | Age: 75
End: 2020-12-18
Payer: MEDICARE

## 2020-12-18 DIAGNOSIS — M81.8 OTHER OSTEOPOROSIS WITHOUT CURRENT PATHOLOGICAL FRACTURE: ICD-10-CM

## 2020-12-18 PROCEDURE — 96372 THER/PROPH/DIAG INJ SC/IM: CPT | Performed by: INTERNAL MEDICINE

## 2021-05-20 LAB — HBA1C MFR BLD HPLC: 5.9 %

## 2021-05-26 ENCOUNTER — TELEPHONE (OUTPATIENT)
Dept: ENDOCRINOLOGY | Facility: CLINIC | Age: 76
End: 2021-05-26

## 2021-05-27 ENCOUNTER — OFFICE VISIT (OUTPATIENT)
Dept: ENDOCRINOLOGY | Facility: CLINIC | Age: 76
End: 2021-05-27
Payer: MEDICARE

## 2021-05-27 ENCOUNTER — TELEPHONE (OUTPATIENT)
Dept: ENDOCRINOLOGY | Facility: CLINIC | Age: 76
End: 2021-05-27

## 2021-05-27 VITALS
DIASTOLIC BLOOD PRESSURE: 70 MMHG | BODY MASS INDEX: 22.98 KG/M2 | HEIGHT: 64 IN | HEART RATE: 97 BPM | WEIGHT: 134.6 LBS | SYSTOLIC BLOOD PRESSURE: 130 MMHG

## 2021-05-27 DIAGNOSIS — M81.8 OTHER OSTEOPOROSIS WITHOUT CURRENT PATHOLOGICAL FRACTURE: Primary | ICD-10-CM

## 2021-05-27 DIAGNOSIS — R94.6 ABNORMAL THYROID FUNCTION TEST: ICD-10-CM

## 2021-05-27 PROCEDURE — 99214 OFFICE O/P EST MOD 30 MIN: CPT | Performed by: INTERNAL MEDICINE

## 2021-05-27 NOTE — PROGRESS NOTES
Ivis Aguiar 67 y o  female MRN: [id number]    Encounter: [id number]      Assessment/Plan     Problem List Items Addressed This Visit        Musculoskeletal and Integument    Other osteoporosis without current pathological fracture - Primary     Continue calcium and vitamin-D supplementations-continue Prolia  She will be due for a DEXA scan later this year         Relevant Orders    DXA bone density spine hip and pelvis    Vitamin D 25 hydroxy Lab Collect    Basic metabolic panel Lab Collect    T4, free Lab Collect    TSH, 3rd generation Lab Collect       Other    Abnormal thyroid function test     Will repeat labs               CC:   Osteoporosis     History of Present Illness     HPI:  69-year-old female with osteoporosis seen in follow-up    She is currently on  Calcium 1000 mg daily   1 serving of dairy   Vitamin D3 1000 iu daily   No falls /fractures since her last visit  She has so for Received 2 doses of prolia  , tolerated ok       Review of Systems    Historical Information   Past Medical History:   Diagnosis Date    Squamous cell skin cancer 2011    lip      Past Surgical History:   Procedure Laterality Date    LUMBAR FUSION Bilateral 03/03/2020    L4-L5    OOPHORECTOMY Right     age 38   [facility]GERY  2015    Lumbar laminectomy      Social History   Social History     Substance and Sexual Activity   Alcohol Use None     Social History     Substance and Sexual Activity   Drug Use Not on file     Social History     Tobacco Use   Smoking Status Not on file     Family History:   Family History   Problem Relation Age of Onset    No Known Problems Daughter     Thyroid disease unspecified Daughter     No Known Problems Maternal Aunt     Heart failure Mother     COPD Father     No Known Problems Maternal Grandmother     No Known Problems Maternal Grandfather     No Known Problems Paternal Grandmother     No Known Problems Paternal Grandfather        Meds/Allergies   Current Outpatient Medications   Medication Sig Dispense Refill    ALPRAZolam (XANAX) 1 mg tablet Take 1 mg by mouth daily at bedtime as needed for anxiety      metoprolol succinate (TOPROL-XL) 25 mg 24 hr tablet Take 25 mg by mouth daily      omeprazole (PriLOSEC) 40 MG capsule Take 40 mg by mouth daily      sertraline (ZOLOFT) 50 mg tablet Take 100 mg by mouth daily        Current Facility-Administered Medications   Medication Dose Route Frequency Provider Last Rate Last Admin    denosumab (PROLIA) subcutaneous injection 60 mg  60 mg Subcutaneous Q6 Months Codey Lopez MD   60 mg at 12/18/20 1009     Allergies   Allergen Reactions    Shellfish-Derived Products - Food Allergy Anaphylaxis and Swelling       Objective   Vitals: Blood pressure 130/70, pulse 97, height 5' 4" (1 626 m), weight 61 1 kg (134 lb 9 6 oz)  Physical Exam  Vitals signs reviewed  Constitutional:       Appearance: Normal appearance  She is not ill-appearing or diaphoretic  HENT:      Head: Normocephalic and atraumatic  Eyes:      General: No scleral icterus  Extraocular Movements: Extraocular movements intact  Neck:      Musculoskeletal: Neck supple  Cardiovascular:      Rate and Rhythm: Normal rate and regular rhythm  Heart sounds: Normal heart sounds  No murmur  Pulmonary:      Effort: Pulmonary effort is normal  No respiratory distress  Breath sounds: Normal breath sounds  No wheezing or rales  Abdominal:      General: There is no distension  Palpations: Abdomen is soft  Tenderness: There is no abdominal tenderness  There is no guarding  Musculoskeletal:      Right lower leg: No edema  Left lower leg: No edema  Lymphadenopathy:      Cervical: No cervical adenopathy  Skin:     General: Skin is warm and dry  Neurological:      General: No focal deficit present  Mental Status: She is alert and oriented to person, place, and time     Psychiatric:         Mood and Affect: Mood normal  Behavior: Behavior normal          Thought Content: Thought content normal          Judgment: Judgment normal          The history was obtained from the review of the chart, patient  Lab Results:          Imaging Studies:            Results for orders placed during the hospital encounter of 12/09/19   DXA bone density spine hip and pelvis    Impression 1  Low bone mass (osteopenia)  [Based on the left femoral neck]    2  Since a DXA study from 5/19/2017,  there has been a [address] in bone mineral density  in the left hip  3   The 10 year risk of hip fracture is 4 9% with the 10 year risk of major osteoporotic fracture being 15% as calculated by the [facility]/WHO fracture risk assessment tool (FRAX)  4   The current NOF guidelines recommend treating patients with a T-score of -2 5 or less in the lumbar spine or hips, or in post-menopausal women and men over the age of 48 with low bone mass (osteopenia) and a FRAX 10 year risk score of >3% for hip   fracture and/or >20% for major osteoporotic fracture  5   The NOF recommends follow-up DXA in 1-2 years after initiating therapy for osteoporosis and every 2 years thereafter  More frequent evaluation is appropriate for patients with conditions associated with rapid bone loss, such as glucocorticoid   therapy  The interval between DXA screenings may be longer for individuals without major risk factors and initial T-score in the normal or upper low bone mass range  The FRAX algorithm has certain limitations:  -FRAX has not been validated in patients currently or previously treated with pharmacotherapy for osteoporosis  In such patients, clinical judgment must be exercised in interpreting FRAX scores      -Prior hip, vertebral and humeral fragility fractures appear to confer greater risk of subsequent fracture than fractures at other sites (this is especially true for individuals with severe vertebral fractures), but quantification of this incremental   risk is not possible with FRAX  -FRAX underestimates fracture risk in patients with history of multiple fragility fractures  -FRAX may underestimate fracture risk in patients with history of frequent falls   -It is not appropriate to use FRAX to monitor treatment response  WHO CLASSIFICATION:  Normal (a T-score of -1 0 or higher)  Low bone mineral density (a T-score of less than -1 0 but higher than -2 5)  Osteoporosis (a T-score of -2 5 or less)  Severe osteoporosis (a T-score of -2 5 or less with a fragility fracture)    LEAST SIGNIFICANT CHANGE (AT 95% C  I):  Lumbar spine: 0 035 g/cm2; 3 6%  Total hip: 0 026 g/cm2; 3 2%  Forearm: 0 027 g/cm2; 4 8%            Workstation performed: [id number]                  I have personally reviewed pertinent reports  Portions of the record may have been created with voice recognition software  Occasional wrong word or "sound a like" substitutions may have occurred due to the inherent limitations of voice recognition software  Read the chart carefully and recognize, using context, where substitutions have occurred

## 2021-06-02 ENCOUNTER — TELEPHONE (OUTPATIENT)
Dept: ENDOCRINOLOGY | Facility: CLINIC | Age: 76
End: 2021-06-02

## 2021-06-02 NOTE — TELEPHONE ENCOUNTER
Pt called today to see if you received her labs results that were order by her PCP,  also she would like to know if she needs to get any more labs

## 2021-06-03 NOTE — TELEPHONE ENCOUNTER
Yes I have reviewed labs-hemoglobin A1c was 5 9 in pre diabetes range and cholesterol was a little better    I had ordered labs when she was here that she needs to have done prior next appointment

## 2021-06-03 NOTE — TELEPHONE ENCOUNTER
Pt would like to know if she can get her labs done sooner (Pending labs are for  11/2021), also would like to know if you would like to check her Calcium

## 2021-06-03 NOTE — ASSESSMENT & PLAN NOTE
Continue calcium and vitamin-D supplementations-continue Prolia    She will be due for a DEXA scan later this year

## 2021-06-08 ENCOUNTER — TELEPHONE (OUTPATIENT)
Dept: ENDOCRINOLOGY | Facility: CLINIC | Age: 76
End: 2021-06-08

## 2021-06-08 RX ORDER — OXYCODONE AND ACETAMINOPHEN 10; 325 MG/1; MG/1
1 TABLET ORAL EVERY 8 HOURS PRN
COMMUNITY
Start: 2021-05-24 | End: 2021-12-27 | Stop reason: ALTCHOICE

## 2021-06-21 ENCOUNTER — CLINICAL SUPPORT (OUTPATIENT)
Dept: ENDOCRINOLOGY | Facility: CLINIC | Age: 76
End: 2021-06-21
Payer: MEDICARE

## 2021-06-21 DIAGNOSIS — M81.8 OTHER OSTEOPOROSIS WITHOUT CURRENT PATHOLOGICAL FRACTURE: ICD-10-CM

## 2021-06-21 PROCEDURE — 96372 THER/PROPH/DIAG INJ SC/IM: CPT | Performed by: INTERNAL MEDICINE

## 2021-10-07 ENCOUNTER — HOSPITAL ENCOUNTER (OUTPATIENT)
Dept: MAMMOGRAPHY | Facility: CLINIC | Age: 76
Discharge: HOME | End: 2021-10-07
Payer: MEDICARE

## 2021-10-07 VITALS — HEIGHT: 64 IN | WEIGHT: 136 LBS | BODY MASS INDEX: 23.22 KG/M2

## 2021-10-07 DIAGNOSIS — Z12.31 ENCOUNTER FOR SCREENING MAMMOGRAM FOR MALIGNANT NEOPLASM OF BREAST: ICD-10-CM

## 2021-10-07 PROCEDURE — 77063 BREAST TOMOSYNTHESIS BI: CPT

## 2021-10-07 PROCEDURE — 77067 SCR MAMMO BI INCL CAD: CPT

## 2021-11-24 LAB
25(OH)D3 SERPL-MCNC: 46 NG/ML (ref 30–100)
T4 FREE SERPL-MCNC: 0.9 NG/DL (ref 0.8–1.8)
TSH SERPL-ACNC: 1.43 MIU/L (ref 0.4–4.5)

## 2021-11-29 ENCOUNTER — TELEPHONE (OUTPATIENT)
Dept: ENDOCRINOLOGY | Facility: CLINIC | Age: 76
End: 2021-11-29

## 2021-12-13 ENCOUNTER — HOSPITAL ENCOUNTER (OUTPATIENT)
Dept: BONE DENSITY | Facility: CLINIC | Age: 76
Discharge: HOME | End: 2021-12-13
Payer: MEDICARE

## 2021-12-13 DIAGNOSIS — M81.8 OTHER OSTEOPOROSIS WITHOUT CURRENT PATHOLOGICAL FRACTURE: ICD-10-CM

## 2021-12-13 PROCEDURE — 77080 DXA BONE DENSITY AXIAL: CPT

## 2021-12-14 ENCOUNTER — TELEPHONE (OUTPATIENT)
Dept: ENDOCRINOLOGY | Facility: CLINIC | Age: 76
End: 2021-12-14

## 2021-12-14 RX ORDER — SERTRALINE HYDROCHLORIDE 100 MG/1
100 TABLET, FILM COATED ORAL DAILY
COMMUNITY
Start: 2021-09-22

## 2021-12-14 RX ORDER — OMEPRAZOLE 20 MG/1
20 CAPSULE, DELAYED RELEASE ORAL DAILY
COMMUNITY
Start: 2021-12-06

## 2021-12-14 RX ORDER — SERTRALINE HYDROCHLORIDE 25 MG/1
25 TABLET, FILM COATED ORAL DAILY
COMMUNITY
Start: 2021-10-04 | End: 2021-12-27 | Stop reason: DRUGHIGH

## 2021-12-14 RX ORDER — LIFITEGRAST 50 MG/ML
1 SOLUTION/ DROPS OPHTHALMIC DAILY
COMMUNITY
Start: 2021-09-26

## 2021-12-14 RX ORDER — BUPROPION HYDROCHLORIDE 150 MG/1
150 TABLET ORAL DAILY
COMMUNITY
Start: 2021-12-02 | End: 2021-12-27 | Stop reason: ALTCHOICE

## 2021-12-14 RX ORDER — LIDOCAINE 50 MG/G
PATCH TOPICAL DAILY
COMMUNITY
Start: 2021-11-29

## 2021-12-15 ENCOUNTER — VBI (OUTPATIENT)
Dept: ADMINISTRATIVE | Facility: OTHER | Age: 76
End: 2021-12-15

## 2021-12-22 ENCOUNTER — OFFICE VISIT (OUTPATIENT)
Dept: ENDOCRINOLOGY | Facility: CLINIC | Age: 76
End: 2021-12-22
Payer: MEDICARE

## 2021-12-22 ENCOUNTER — TELEPHONE (OUTPATIENT)
Dept: ENDOCRINOLOGY | Facility: CLINIC | Age: 76
End: 2021-12-22

## 2021-12-22 VITALS
WEIGHT: 135.6 LBS | HEIGHT: 64 IN | DIASTOLIC BLOOD PRESSURE: 70 MMHG | SYSTOLIC BLOOD PRESSURE: 108 MMHG | BODY MASS INDEX: 23.15 KG/M2 | HEART RATE: 92 BPM

## 2021-12-22 DIAGNOSIS — M81.8 OTHER OSTEOPOROSIS WITHOUT CURRENT PATHOLOGICAL FRACTURE: Primary | ICD-10-CM

## 2021-12-22 PROCEDURE — 96372 THER/PROPH/DIAG INJ SC/IM: CPT | Performed by: INTERNAL MEDICINE

## 2021-12-22 PROCEDURE — 99214 OFFICE O/P EST MOD 30 MIN: CPT | Performed by: INTERNAL MEDICINE

## 2021-12-24 PROBLEM — M81.0 OSTEOPOROSIS: Status: ACTIVE | Noted: 2021-12-24

## 2021-12-27 ENCOUNTER — ANNUAL EXAM (OUTPATIENT)
Dept: OBGYN CLINIC | Facility: CLINIC | Age: 76
End: 2021-12-27
Payer: MEDICARE

## 2021-12-27 VITALS
HEIGHT: 63 IN | SYSTOLIC BLOOD PRESSURE: 130 MMHG | WEIGHT: 136 LBS | DIASTOLIC BLOOD PRESSURE: 70 MMHG | BODY MASS INDEX: 24.1 KG/M2

## 2021-12-27 DIAGNOSIS — Z12.31 BREAST CANCER SCREENING BY MAMMOGRAM: ICD-10-CM

## 2021-12-27 DIAGNOSIS — Z01.419 ENCOUNTER FOR GYNECOLOGICAL EXAMINATION (GENERAL) (ROUTINE) WITHOUT ABNORMAL FINDINGS: Primary | ICD-10-CM

## 2021-12-27 PROCEDURE — G0101 CA SCREEN;PELVIC/BREAST EXAM: HCPCS | Performed by: OBSTETRICS & GYNECOLOGY

## 2022-05-31 LAB
25(OH)D3 SERPL-MCNC: 59 NG/ML (ref 30–100)
T4 FREE SERPL-MCNC: 1.1 NG/DL (ref 0.8–1.8)
TSH SERPL-ACNC: 0.88 MIU/L (ref 0.4–4.5)

## 2022-06-13 ENCOUNTER — HOSPITAL ENCOUNTER (OUTPATIENT)
Dept: CT IMAGING | Facility: HOSPITAL | Age: 77
Discharge: HOME | End: 2022-06-13
Payer: COMMERCIAL

## 2022-06-13 DIAGNOSIS — M16.11 UNILATERAL PRIMARY OSTEOARTHRITIS, RIGHT HIP: ICD-10-CM

## 2022-06-13 PROCEDURE — G1004 CDSM NDSC: HCPCS

## 2022-06-13 PROCEDURE — 73700 CT LOWER EXTREMITY W/O DYE: CPT

## 2022-06-14 ENCOUNTER — TELEPHONE (OUTPATIENT)
Dept: ENDOCRINOLOGY | Facility: CLINIC | Age: 77
End: 2022-06-14

## 2022-06-27 ENCOUNTER — CLINICAL SUPPORT (OUTPATIENT)
Dept: ENDOCRINOLOGY | Facility: CLINIC | Age: 77
End: 2022-06-27
Payer: COMMERCIAL

## 2022-06-27 DIAGNOSIS — M81.8 OTHER OSTEOPOROSIS WITHOUT CURRENT PATHOLOGICAL FRACTURE: ICD-10-CM

## 2022-06-27 PROCEDURE — 96372 THER/PROPH/DIAG INJ SC/IM: CPT | Performed by: INTERNAL MEDICINE

## 2022-07-08 NOTE — PROGRESS NOTES
Established Patient Progress Note    CC: Follow up for osteoporosis    History of Present Illness: China Del Rosario is a 67 y o  female with osteoporosis  She has been on Prolia injections since December 2020  Her last injection was 3-4 weeks ago  She reports no side effects  Her last DXA from 12/13/21 showed t score of -2 2 in the left forearm  She continues to do strength training and resistance training  She takes 950 mg of calcium citrate/day and 2,000 IU of vitamin D/day  She denies any falls or fractures      Patient Active Problem List   Diagnosis    Other osteoporosis without current pathological fracture    Tachycardia    Abnormal thyroid function test    Osteoporosis    Encounter for gynecological examination (general) (routine) without abnormal findings      Past Medical History:   Diagnosis Date    Anxiety     Fusion of spine     Osteopenia     Osteoporosis     managed by endocrinology, on Prolia    Squamous cell skin cancer 2011    lip       Past Surgical History:   Procedure Laterality Date    BLADDER REPAIR  2008    vaginal    COLONOSCOPY  2008    DXA PROCEDURE (HISTORICAL)  2019    ENDOMETRIAL BIOPSY  2017    LUMBAR FUSION Bilateral 03/03/2020    L4-L5    MAMMO (HISTORICAL)  2012    OOPHORECTOMY Right     age 38    OVARIAN CYST REMOVAL Left 1962    SPINAL FUSION  2020    SPINE SURGERY  2015    Lumbar laminectomy       Family History   Problem Relation Age of Onset    Heart failure Mother     COPD Father     No Known Problems Maternal Grandmother     No Known Problems Maternal Grandfather     No Known Problems Paternal Grandmother     No Known Problems Paternal Grandfather     No Known Problems Daughter     Thyroid disease unspecified Daughter     No Known Problems Maternal Aunt     Breast cancer Neg Hx     Uterine cancer Neg Hx     Ovarian cancer Neg Hx     Colon cancer Neg Hx      Social History     Tobacco Use    Smoking status: Never Smoker    Smokeless tobacco: Never Used   Substance Use Topics    Alcohol use: Yes     Alcohol/week: 2 0 standard drinks     Types: 2 Glasses of wine per week     Comment: 5-6 a week     Allergies   Allergen Reactions    Shellfish-Derived Products - Food Allergy Anaphylaxis and Swelling    Povidone Iodine Rash         Current Outpatient Medications:     ALPRAZolam (XANAX) 1 mg tablet, Take 1 mg by mouth daily at bedtime as needed for anxiety, Disp: , Rfl:     buPROPion (WELLBUTRIN XL) 150 mg 24 hr tablet, Take 150 mg by mouth in the morning, Disp: , Rfl:     calcium citrate (CALCITRATE) 950 (200 Ca) MG tablet, Take 1 tablet by mouth daily, Disp: , Rfl:     cholecalciferol (VITAMIN D3) 1,000 units tablet, Take 2,000 Units by mouth daily, Disp: , Rfl:     lidocaine (LIDODERM) 5 %, Apply topically daily (as directed), Disp: , Rfl:     metoprolol succinate (TOPROL-XL) 25 mg 24 hr tablet, Take 25 mg by mouth daily, Disp: , Rfl:     Xiidra 5 % op solution, Administer 1 drop to both eyes daily, Disp: , Rfl:     omeprazole (PriLOSEC) 20 mg delayed release capsule, Take 20 mg by mouth daily (Patient not taking: No sig reported), Disp: , Rfl:     sertraline (ZOLOFT) 100 mg tablet, Take 100 mg by mouth daily (Patient not taking: No sig reported), Disp: , Rfl:     Current Facility-Administered Medications:     denosumab (PROLIA) subcutaneous injection 60 mg, 60 mg, Subcutaneous, Q6 Months, Salma Kapoor MD, 60 mg at 06/27/22 1008    Review of Systems   Constitutional: Negative for chills and fever  HENT: Negative for ear pain and sore throat  Eyes: Negative for pain and visual disturbance  Respiratory: Negative for cough and shortness of breath  Cardiovascular: Negative for chest pain and palpitations  Gastrointestinal: Negative for abdominal pain and vomiting  Genitourinary: Negative for dysuria and hematuria  Musculoskeletal: Negative for arthralgias and back pain  Skin: Negative for color change and rash  Neurological: Negative for seizures and syncope  All other systems reviewed and are negative  Physical Exam:  Body mass index is 23 9 kg/m²  /80   Pulse 90   Ht 5' 3 3" (1 608 m)   Wt 61 8 kg (136 lb 3 2 oz)   BMI 23 90 kg/m²    Wt Readings from Last 3 Encounters:   07/11/22 61 8 kg (136 lb 3 2 oz)   12/27/21 61 7 kg (136 lb)   12/22/21 61 5 kg (135 lb 9 6 oz)       Physical Exam  Vitals reviewed  Constitutional:       Appearance: Normal appearance  HENT:      Head: Normocephalic  Cardiovascular:      Rate and Rhythm: Normal rate and regular rhythm  Heart sounds: Normal heart sounds  Pulmonary:      Effort: Pulmonary effort is normal       Breath sounds: Normal breath sounds  Musculoskeletal:      Comments: No bone pain on exam of the spine or bilateral shins   Neurological:      Mental Status: She is alert and oriented to person, place, and time  Psychiatric:         Mood and Affect: Mood normal          Behavior: Behavior normal          Labs:   Lab Results   Component Value Date    HGBA1C 5 9 05/20/2021    HGBA1C 5 7 12/07/2020     Lab Results   Component Value Date    CREATININE 0 82 05/21/2020    BUN 19 05/21/2020    K 4 7 05/21/2020    CL 99 05/21/2020    CO2 26 05/21/2020     No results found for: EGFR  No results found for: CHOL, HDL, LDL, TRIG, CHOLHDL  Lab Results   Component Value Date    ALT 15 05/21/2020    AST 22 05/21/2020     No results found for: [facility]  Lab Results   Component Value Date    FREET4 1 1 05/31/2022       Impression & Plan:    Problem List Items Addressed This Visit        Musculoskeletal and Integument    Other osteoporosis without current pathological fracture - Primary     Due for CMP  She states she will have that done  Next DXA will be 12/2023  Her last one showed an improvement of 5% in BMD of the left total hip  She feels well on Prolia  Most recent vitamin D level at goal  Continue with current regimen              Relevant Orders Comprehensive metabolic panel Lab Collect          Orders Placed This Encounter   Procedures    Comprehensive metabolic panel Lab Collect     This is a patient instruction: Patient fasting for 8 hours or longer recommended  Standing Status:   Future     Number of Occurrences:   1     Standing Expiration Date:   7/8/2023       There are no Patient Instructions on file for this visit  Goal vitamin D > 40  Goal calcium ~1200 mg/day    Discussed with the patient and all questioned fully answered  She will call me if any problems arise

## 2022-07-11 ENCOUNTER — OFFICE VISIT (OUTPATIENT)
Dept: ENDOCRINOLOGY | Facility: CLINIC | Age: 77
End: 2022-07-11
Payer: COMMERCIAL

## 2022-07-11 VITALS
WEIGHT: 136.2 LBS | DIASTOLIC BLOOD PRESSURE: 80 MMHG | SYSTOLIC BLOOD PRESSURE: 132 MMHG | BODY MASS INDEX: 24.13 KG/M2 | HEART RATE: 90 BPM | HEIGHT: 63 IN

## 2022-07-11 DIAGNOSIS — M81.8 OTHER OSTEOPOROSIS WITHOUT CURRENT PATHOLOGICAL FRACTURE: Primary | ICD-10-CM

## 2022-07-11 PROCEDURE — 99214 OFFICE O/P EST MOD 30 MIN: CPT

## 2022-07-11 RX ORDER — BUPROPION HYDROCHLORIDE 150 MG/1
150 TABLET ORAL DAILY
COMMUNITY
Start: 2022-06-02

## 2022-07-11 RX ORDER — MELATONIN
2000 DAILY
COMMUNITY

## 2022-07-11 RX ORDER — IBUPROFEN 200 MG
1 CAPSULE ORAL DAILY
COMMUNITY

## 2022-07-11 NOTE — ASSESSMENT & PLAN NOTE
Due for CMP  She states she will have that done  Next DXA will be 12/2023  Her last one showed an improvement of 5% in BMD of the left total hip  She feels well on Prolia  Most recent vitamin D level at goal  Continue with current regimen

## 2022-08-11 ENCOUNTER — HOSPITAL ENCOUNTER (OUTPATIENT)
Dept: MRI IMAGING | Facility: HOSPITAL | Age: 77
Discharge: HOME | End: 2022-08-11
Payer: COMMERCIAL

## 2022-08-11 DIAGNOSIS — M43.16 SPONDYLOLISTHESIS OF LUMBAR REGION: ICD-10-CM

## 2022-08-11 DIAGNOSIS — M41.86 OTHER FORMS OF SCOLIOSIS, LUMBAR REGION: ICD-10-CM

## 2022-08-11 DIAGNOSIS — Z98.1 HISTORY OF ANKLE FUSION: ICD-10-CM

## 2022-08-11 DIAGNOSIS — M48.062 PSEUDOCLAUDICATION SYNDROME: ICD-10-CM

## 2022-08-11 PROCEDURE — 72148 MRI LUMBAR SPINE W/O DYE: CPT

## 2022-10-15 ENCOUNTER — HOSPITAL ENCOUNTER (OUTPATIENT)
Dept: MAMMOGRAPHY | Facility: CLINIC | Age: 77
Discharge: HOME | End: 2022-10-15
Payer: COMMERCIAL

## 2022-10-15 VITALS — HEIGHT: 63 IN | WEIGHT: 134.48 LBS | BODY MASS INDEX: 23.83 KG/M2

## 2022-10-15 DIAGNOSIS — Z12.31 VISIT FOR SCREENING MAMMOGRAM: ICD-10-CM

## 2022-10-15 DIAGNOSIS — Z12.31 BREAST CANCER SCREENING BY MAMMOGRAM: ICD-10-CM

## 2022-10-15 PROCEDURE — 77067 SCR MAMMO BI INCL CAD: CPT

## 2022-10-15 PROCEDURE — 77063 BREAST TOMOSYNTHESIS BI: CPT

## 2022-12-15 ENCOUNTER — TELEPHONE (OUTPATIENT)
Dept: ENDOCRINOLOGY | Facility: CLINIC | Age: 77
End: 2022-12-15

## 2022-12-22 ENCOUNTER — TELEPHONE (OUTPATIENT)
Dept: ENDOCRINOLOGY | Facility: HOSPITAL | Age: 77
End: 2022-12-22

## 2022-12-22 NOTE — TELEPHONE ENCOUNTER
Patients spouse stopped in to our office with a copy of blood work which we will send to [facility]  She had a CMP done on 11/28/22  Her Calcium was 9 5 and her Albumin was 4 2  Is she good for her Prolia on 12/29 at my office?

## 2022-12-29 ENCOUNTER — CLINICAL SUPPORT (OUTPATIENT)
Dept: ENDOCRINOLOGY | Facility: HOSPITAL | Age: 77
End: 2022-12-29

## 2022-12-29 ENCOUNTER — TELEPHONE (OUTPATIENT)
Dept: ENDOCRINOLOGY | Facility: HOSPITAL | Age: 77
End: 2022-12-29

## 2022-12-29 DIAGNOSIS — M81.6 LOCALIZED OSTEOPOROSIS WITHOUT CURRENT PATHOLOGICAL FRACTURE: Primary | ICD-10-CM

## 2022-12-29 DIAGNOSIS — M81.0 AGE RELATED OSTEOPOROSIS, UNSPECIFIED PATHOLOGICAL FRACTURE PRESENCE: Primary | ICD-10-CM

## 2022-12-29 NOTE — TELEPHONE ENCOUNTER
Patient has switched from [facility] to [facility]  What labs do you want for that follow up other than a CMP?

## 2022-12-29 NOTE — PROGRESS NOTES
Assessment/Plan:    Rafael Ayoub came into the [address] [place] [address]'s Endocrinology Office today 12/29/22 to receive her Prolia injection  Verbal consent obtained  Consent given by: patient    patient states patient has been medically healthy with no underlining concerns/complications  Rafael Ayoub presents with no symptoms today  All insturctions were reviewed with the patient  If the patient should have any questions/concerns, advised patient to contacted [facility] Endocrinology Office  Subjective:     History provided by: patient    Patient ID: Rafael Ayoub is a 65 y o  female      Objective: There were no vitals filed for this visit  Patient tolerated the injection well without any complications  Injection site/s Left Arm  Medication was provided by the office  Patient signed consent form yes   Patient signed Arlander Fret form yes (If no patient is not a medicare patient)  Patient waited 15 minutes after injection no (This only applies to patient's receiving first time injection)         Last Visit: 12/22/2022  Next visit:12/29/2022

## 2023-06-05 LAB — HBA1C MFR BLD HPLC: 6 %

## 2023-06-30 ENCOUNTER — TELEPHONE (OUTPATIENT)
Dept: ENDOCRINOLOGY | Facility: HOSPITAL | Age: 78
End: 2023-06-30

## 2023-06-30 NOTE — TELEPHONE ENCOUNTER
Called insurance and spoke to [address] [place] [address],[address] was approved for 2 injections through [place] from 6/30/23 through 6/30/24  Auth # [id number]

## 2023-07-06 ENCOUNTER — OFFICE VISIT (OUTPATIENT)
Dept: ENDOCRINOLOGY | Facility: HOSPITAL | Age: 78
End: 2023-07-06
Payer: COMMERCIAL

## 2023-07-06 VITALS
HEART RATE: 61 BPM | SYSTOLIC BLOOD PRESSURE: 126 MMHG | WEIGHT: 126 LBS | OXYGEN SATURATION: 99 % | HEIGHT: 63 IN | DIASTOLIC BLOOD PRESSURE: 76 MMHG | BODY MASS INDEX: 22.32 KG/M2

## 2023-07-06 DIAGNOSIS — R94.6 ABNORMAL THYROID FUNCTION TEST: ICD-10-CM

## 2023-07-06 DIAGNOSIS — M81.8 OTHER OSTEOPOROSIS WITHOUT CURRENT PATHOLOGICAL FRACTURE: Primary | ICD-10-CM

## 2023-07-06 DIAGNOSIS — R73.03 PREDIABETES: ICD-10-CM

## 2023-07-06 PROCEDURE — 99214 OFFICE O/P EST MOD 30 MIN: CPT | Performed by: INTERNAL MEDICINE

## 2023-07-06 PROCEDURE — 96372 THER/PROPH/DIAG INJ SC/IM: CPT | Performed by: INTERNAL MEDICINE

## 2023-07-06 RX ORDER — ROSUVASTATIN CALCIUM 5 MG/1
TABLET, COATED ORAL EVERY 24 HOURS
COMMUNITY

## 2023-07-06 RX ORDER — AZELASTINE 1 MG/ML
SPRAY, METERED NASAL EVERY 24 HOURS
COMMUNITY
Start: 2023-06-14

## 2023-07-06 RX ORDER — OXYCODONE AND ACETAMINOPHEN 10; 325 MG/1; MG/1
1 TABLET ORAL EVERY 8 HOURS PRN
COMMUNITY
Start: 2023-06-17

## 2023-07-06 RX ORDER — LANOLIN ALCOHOL/MO/W.PET/CERES
2 CREAM (GRAM) TOPICAL DAILY
COMMUNITY

## 2023-07-06 RX ORDER — DENOSUMAB 60 MG/ML
INJECTION SUBCUTANEOUS
COMMUNITY
Start: 2020-11-14

## 2023-07-06 RX ORDER — VIT C/B6/B5/MAGNESIUM/HERB 173 50-5-6-5MG
CAPSULE ORAL DAILY
COMMUNITY

## 2023-07-06 NOTE — PATIENT INSTRUCTIONS
The blood work is ok for the prolia. Work on watching the amount of carbohydrates/sugars. Continue the exercise. Continue the calcium and vitamin d. We'll continue the prolia every 6 months. No need for forteo for now unless something changes. We'll recheck the dexa scan after 12/14/2023. Follow up in 6 months with blood work for prolia injection. Follow up in 1 year with blood work.

## 2023-07-06 NOTE — PROGRESS NOTES
Assessment/Plan:    Mitchell Bruner came into the [facility] Endocrinology Office today 07/06/23 to receive a Prolia injection injection. Verbal consent obtained. Consent given by: patient    patient states patient has been medically healthy with no underlining concerns/complications. Mitchell Bruner presents with no symptoms today. All insturctions were reviewed with the patient. If the patient should have any questions/concerns, advised patient to contacted [facility] Endocrinology Office. Subjective:     History provided by: patient/ provider    Patient ID: Mitchell Bruner is a 65 y.o. female      Objective:    Vitals:    07/06/23 1147   BP: 126/76   Pulse: 61   SpO2: 99%   Weight: 57.2 kg (126 lb)   Height: 5' 3" (1.6 m)       Patient tolerated the injection well without any complications. Injection site/s left arm   Medication was provided by our supply/ buy and bill. Patient signed consent form yes   Patient signed Toni Slain form yes (If no patient is not a medicare patient). Patient waited 15 minutes after injection no (This only applies to patient's receiving first time injection).        Last Visit: 6/30/2023  Next visit:Visit date not found

## 2023-07-06 NOTE — PROGRESS NOTES
7/6/2023    Assessment/Plan      Diagnoses and all orders for this visit:    Other osteoporosis without current pathological fracture  -     Comprehensive metabolic panel Lab Collect; Future  -     Comprehensive metabolic panel Lab Collect  -     HEMOGLOBIN A1C W/ EAG ESTIMATION Lab Collect; Future  -     Comprehensive metabolic panel Lab Collect; Future  -     TSH, 3rd generation Lab Collect; Future  -     T4, free Lab Collect; Future  -     Comprehensive metabolic panel Lab Collect  -     TSH, 3rd generation Lab Collect  -     T4, free Lab Collect  -     DXA bone density spine hip and pelvis; Future  -     denosumab (PROLIA) subcutaneous injection 60 mg    Abnormal thyroid function test  -     HEMOGLOBIN A1C W/ EAG ESTIMATION Lab Collect; Future  -     Comprehensive metabolic panel Lab Collect; Future  -     TSH, 3rd generation Lab Collect; Future  -     T4, free Lab Collect; Future  -     Comprehensive metabolic panel Lab Collect  -     TSH, 3rd generation Lab Collect  -     T4, free Lab Collect    Prediabetes    Other orders  -     denosumab (Prolia) 60 mg/mL  -     rosuvastatin (CRESTOR) 5 mg tablet; every 24 hours  -     azelastine (ASTELIN) 0.1 % nasal spray; every 24 hours  -     oxyCODONE-acetaminophen (PERCOCET)  mg per tablet; Take 1 tablet by mouth every 8 (eight) hours as needed  -     glucosamine-chondroitin 500-400 MG tablet; Take 2 tablets by mouth in the morning  -     Turmeric 500 MG CAPS; Take by mouth daily  -     Probiotic Product (PROBIOTIC-10 PO); Take by mouth daily  -     TART CHERRY PO; Take by mouth daily        Assessment/Plan:  1. Osteoporosis. Blood work is okay and she will continue to utilize Prolia injection 60 mg every 6 months. She is due for injection today. She will continue to work on weightbearing exercises. She will continue the same calcium and vitamin D replacement. She is due for repeat DEXA scan in December 2023 which I have ordered.   I reassured her that based on her DEXA scan's that have been improving, that there is no need for her to switch to [facility] as she has not had any fragility fractures and does not have severe osteoporosis. 2.  Prediabetes. She does have a mildly elevated hemoglobin A1c and fasting glucose. She will work on carbohydrate and sugar reduction and exercise. 3.  Abnormal thyroid function studies. She did have abnormal thyroid function studies in the past and I will repeat her thyroid levels again next year. I have asked her to follow-up with MA visit in 6 months for repeat Prolia injection with a CMP. I have asked her to follow-up in 1 year for MD visit with preceding hemoglobin A1c, CMP, TSH, and free T4 and for Prolia injection. She will get a DEXA scan after December 14th 2023. CC: Osteoporosis, prediabetes, thyroid follow-up    History of Present Illness     HPI: David Car is a 65y.o. year old female with history of osteoporosis, prediabetes, abnormal thyroid studies for follow-up visit. She is here for transfer of care from the [facility] office. She was originally seen by Dr. Lesvia Edgar in May 2020 for evaluation regarding osteoporosis. She had been postmenopausal for 25 years prior to 2020 and was on estrogen for 3 to 4 years. She was on both Fosamax and Boniva for a total of 6 years on bisphosphonates. She has no history of fragility fractures. Had L4-5 fusion in 202 and was noted to have osteoporosis on this procedure. She has been receiving Prolia injections since December 2020. Last Prolia injection was December 2022. Last DEXA scan was December 13, 2021 showing a T score of -2.2 in the left forearm. She is also utilizing calcium citrate 950 mg daily and vitamin D 2000 units daily. She has been exercising regularly and has no falls. She does have some low back pain. She does have a history of abnormal thyroid studies with a free T4 that is mildly low with a normal TSH.   Subsequent T4 levels have normalized. She has a history of hyperglycemia and prediabetes and recent hemoglobin A1c was 6%. Review of Systems   Constitutional: Negative for fatigue and unexpected weight change. HENT: Negative for trouble swallowing. Eyes: Negative for visual disturbance. Respiratory: Negative for chest tightness and shortness of breath. Cardiovascular: Negative for chest pain and palpitations. Gastrointestinal: Negative for abdominal pain, constipation, diarrhea and nausea. Endocrine: Negative for cold intolerance, heat intolerance, polydipsia and polyuria. Musculoskeletal: Positive for back pain. Still some low back pain. No falls. Skin: Negative for rash. Has dry skin. Has brittle nails. Has hair loss. Neurological: Negative for dizziness, tremors, weakness, light-headedness and numbness. Psychiatric/Behavioral: Positive for sleep disturbance. Negative for dysphoric mood. The patient is not nervous/anxious. Less length of sleep.         Historical Information   Past Medical History:   Diagnosis Date   • Anxiety    • Fusion of spine    • Osteopenia    • Osteoporosis     managed by endocrinology, on Prolia   • Squamous cell skin cancer 2011    lip      Past Surgical History:   Procedure Laterality Date   • BLADDER REPAIR  2008    vaginal   • COLONOSCOPY  2008   • DXA PROCEDURE (HISTORICAL)  2019   • ENDOMETRIAL BIOPSY  2017   • LUMBAR FUSION Bilateral 03/03/2020    L4-L5   • MAMMO (HISTORICAL)  2012   • OOPHORECTOMY Right     age 38   • OVARIAN CYST REMOVAL Left 1962   • SPINAL FUSION  2020   • SPINE SURGERY  2015    Lumbar laminectomy      Social History   Social History     Substance and Sexual Activity   Alcohol Use Yes   • Alcohol/week: 2.0 standard drinks of alcohol   • Types: 2 Glasses of wine per week    Comment: 5-6 a week     Social History     Substance and Sexual Activity   Drug Use Never     Social History     Tobacco Use   Smoking Status Never Smokeless Tobacco Never     Family History:   Family History   Problem Relation Age of Onset   • Heart failure Mother    • COPD Father    • No Known Problems Maternal Grandmother    • No Known Problems Maternal Grandfather    • No Known Problems Paternal Grandmother    • No Known Problems Paternal Grandfather    • No Known Problems Daughter    • Thyroid disease unspecified Daughter    • No Known Problems Maternal Aunt    • Breast cancer Neg Hx    • Uterine cancer Neg Hx    • Ovarian cancer Neg Hx    • Colon cancer Neg Hx        Meds/Allergies   Current Outpatient Medications   Medication Sig Dispense Refill   • ALPRAZolam (XANAX) 1 mg tablet Take 1 mg by mouth daily at bedtime as needed for anxiety     • azelastine (ASTELIN) 0.1 % nasal spray every 24 hours     • buPROPion (WELLBUTRIN XL) 150 mg 24 hr tablet Take 150 mg by mouth in the morning     • calcium citrate (CALCITRATE) 950 (200 Ca) MG tablet Take 1 tablet by mouth daily     • cholecalciferol (VITAMIN D3) 1,000 units tablet Take 2,000 Units by mouth daily     • denosumab (Prolia) 60 mg/mL      • glucosamine-chondroitin 500-400 MG tablet Take 2 tablets by mouth in the morning     • lidocaine (LIDODERM) 5 % Apply topically daily (as directed)     • metoprolol succinate (TOPROL-XL) 25 mg 24 hr tablet Take 25 mg by mouth daily     • oxyCODONE-acetaminophen (PERCOCET)  mg per tablet Take 1 tablet by mouth every 8 (eight) hours as needed     • Probiotic Product (PROBIOTIC-10 PO) Take by mouth daily     • rosuvastatin (CRESTOR) 5 mg tablet every 24 hours     • TART CHERRY PO Take by mouth daily     • Turmeric 500 MG CAPS Take by mouth daily     • Xiidra 5 % op solution Administer 1 drop to both eyes daily     • omeprazole (PriLOSEC) 20 mg delayed release capsule Take 20 mg by mouth daily (Patient not taking: No sig reported)       Current Facility-Administered Medications   Medication Dose Route Frequency Provider Last Rate Last Admin   • denosumab (PROLIA) subcutaneous injection 60 mg  60 mg Subcutaneous Q6 Months Steve Siddiqi MD   60 mg at 06/27/22 1008   • denosumab (PROLIA) subcutaneous injection 60 mg  60 mg Subcutaneous Q6 Months Lamont Amaro MD   60 mg at 12/29/22 1208     Allergies   Allergen Reactions   • Shellfish-Derived Products - Food Allergy Anaphylaxis and Swelling   • Povidone Iodine Rash       Objective   Vitals: Blood pressure 126/76, pulse 61, height 5' 3" (1.6 m), weight 57.2 kg (126 lb), SpO2 99 %. Invasive Devices     None                 Physical Exam  Vitals reviewed. Constitutional:       Appearance: Normal appearance. She is well-developed. HENT:      Head: Normocephalic and atraumatic. Eyes:      Extraocular Movements: Extraocular movements intact. Conjunctiva/sclera: Conjunctivae normal.      Comments: No lid lag, stare, proptosis, or periorbital edema. Neck:      Thyroid: No thyromegaly. Vascular: No carotid bruit. Comments: Thyroid normal in size. Cardiovascular:      Rate and Rhythm: Normal rate and regular rhythm. Heart sounds: Normal heart sounds. No murmur heard. Pulmonary:      Effort: Pulmonary effort is normal.      Breath sounds: Normal breath sounds. No wheezing. Abdominal:      Palpations: Abdomen is soft. Musculoskeletal:         General: No deformity. Cervical back: Normal range of motion and neck supple. Right lower leg: No edema. Left lower leg: No edema. Comments: No tremor of the outstretched hands. No spinous process tenderness. Lymphadenopathy:      Cervical: No cervical adenopathy. Skin:     General: Skin is warm and dry. Findings: No rash. Neurological:      Mental Status: She is alert and oriented to person, place, and time. Deep Tendon Reflexes: Reflexes are normal and symmetric. Comments: Patellar deep tendon reflexes normal.         The history was obtained from the review of the chart and from the patient.     Lab Results:      Blood work performed on 6/5/2023 showed a hemoglobin A1c of 6%.     CMP showed a glucose of 113 fasting, calcium 10.2 with an albumin of 4.6 but was otherwise normal.    Lab Results   Component Value Date    CREATININE 0.82 05/21/2020    BUN 19 05/21/2020    K 4.7 05/21/2020    CL 99 05/21/2020    CO2 26 05/21/2020         Lab Results   Component Value Date    ALT 15 05/21/2020    AST 22 05/21/2020       Lab Results   Component Value Date    TSH 0.88 05/31/2022    FREET4 1.1 05/31/2022             Future Appointments   Date Time Provider [address] [place] [address]   12/11/2023 10:00 AM Stephanie Michelle MD [facility]   1/8/2024 11:30 AM ENDOCRINOLOGY NURSE Rajni Mares [facility]   7/10/2024 11:40 AM Traci Marroquin MD [facility]

## 2023-10-26 ENCOUNTER — TELEPHONE (OUTPATIENT)
Dept: PSYCHIATRY | Facility: CLINIC | Age: 78
End: 2023-10-26

## 2023-10-26 NOTE — TELEPHONE ENCOUNTER
Writer rec a  and called back pt, Pt stated that she will be moving in a couple of months to [facility] so she did not want to go on wait list.

## 2023-11-06 ENCOUNTER — HOSPITAL ENCOUNTER (OUTPATIENT)
Dept: MAMMOGRAPHY | Facility: CLINIC | Age: 78
Discharge: HOME | End: 2023-11-06
Payer: COMMERCIAL

## 2023-11-06 VITALS — WEIGHT: 126.1 LBS | HEIGHT: 63 IN | BODY MASS INDEX: 22.34 KG/M2

## 2023-11-06 DIAGNOSIS — Z12.31 ENCOUNTER FOR SCREENING MAMMOGRAM FOR MALIGNANT NEOPLASM OF BREAST: ICD-10-CM

## 2023-11-06 PROCEDURE — 77067 SCR MAMMO BI INCL CAD: CPT

## 2023-11-06 PROCEDURE — 77063 BREAST TOMOSYNTHESIS BI: CPT

## 2023-12-11 ENCOUNTER — OFFICE VISIT (OUTPATIENT)
Dept: OBGYN CLINIC | Facility: CLINIC | Age: 78
End: 2023-12-11
Payer: COMMERCIAL

## 2023-12-11 VITALS — WEIGHT: 131 LBS | DIASTOLIC BLOOD PRESSURE: 70 MMHG | SYSTOLIC BLOOD PRESSURE: 112 MMHG | BODY MASS INDEX: 23.21 KG/M2

## 2023-12-11 DIAGNOSIS — R15.0 INCOMPLETE DEFECATION: Primary | ICD-10-CM

## 2023-12-11 DIAGNOSIS — F43.21 GRIEF: ICD-10-CM

## 2023-12-11 PROCEDURE — 99213 OFFICE O/P EST LOW 20 MIN: CPT | Performed by: OBSTETRICS & GYNECOLOGY

## 2023-12-11 RX ORDER — SERTRALINE HYDROCHLORIDE 25 MG/1
25 TABLET, FILM COATED ORAL DAILY
COMMUNITY

## 2023-12-11 NOTE — PROGRESS NOTES
Assessment/Plan:    Fecal smearing  66 yo , last seen 2021, here with complaints of fecal smearing after soft bowel movements. Takes Miralax for constipation. No other breast or gyn concerns. Normal breast and pelvic exams with poor sphincter tone. Last pap neg , no further indicated. Last mammo 23  Dexa with endocrine, on Prolia  Colonoscopy , ?2018    Recommend switching to fiber for regulation which may help with difficult cleaning. Kegel/pelvic floor exercises discussed  RTO for Medicare well check in spring    Grief  26 yo grandson committed suicide 3 months ago, struggling. Takes sertraline, has seen counselor. Grief discussed, encouraged. There are no diagnoses linked to this encounter. Subjective:      Patient ID: Nikky Thompson is a 65 y.o. female. HPI here with concerns with stooling. The following portions of the patient's history were reviewed and updated as appropriate: She  has a past medical history of Anxiety, Fusion of spine, Osteopenia, Osteoporosis, and Squamous cell skin cancer (). She   Patient Active Problem List    Diagnosis Date Noted    Fecal smearing 2023    Grief 2023    Prediabetes 2023    Encounter for gynecological examination (general) (routine) without abnormal findings 2021    Osteoporosis 2021    Abnormal thyroid function test 2020    Other osteoporosis without current pathological fracture 2020    Tachycardia 2020     She  has a past surgical history that includes Oophorectomy (Right); Spine surgery (); Lumbar fusion (Bilateral, 2020); DXA procedure(historical) (); Bladder repair (); Ovarian cyst removal (Left, 196); Endometrial biopsy (); Colonoscopy (); Mammo (historical) (); and Spinal fusion ().   Her family history includes COPD in her father; Heart failure in her mother; No Known Problems in her brother, brother, daughter, maternal aunt, maternal grandfather, maternal grandmother, paternal grandfather, and paternal grandmother; Thyroid disease unspecified in her daughter. She  reports that she has never smoked. She has never used smokeless tobacco. She reports current alcohol use of about 2.0 standard drinks of alcohol per week. She reports that she does not use drugs.   Current Outpatient Medications   Medication Sig Dispense Refill    ALPRAZolam (XANAX) 1 mg tablet Take 1 mg by mouth daily at bedtime as needed for anxiety      azelastine (ASTELIN) 0.1 % nasal spray every 24 hours      buPROPion (WELLBUTRIN XL) 150 mg 24 hr tablet Take 150 mg by mouth in the morning      calcium citrate (CALCITRATE) 950 (200 Ca) MG tablet Take 1 tablet by mouth daily      cholecalciferol (VITAMIN D3) 1,000 units tablet Take 2,000 Units by mouth daily      denosumab (Prolia) 60 mg/mL       glucosamine-chondroitin 500-400 MG tablet Take 2 tablets by mouth in the morning      lidocaine (LIDODERM) 5 % Apply topically daily (as directed)      metoprolol succinate (TOPROL-XL) 25 mg 24 hr tablet Take 25 mg by mouth daily      Probiotic Product (PROBIOTIC-10 PO) Take by mouth daily      rosuvastatin (CRESTOR) 5 mg tablet every 24 hours      sertraline (ZOLOFT) 25 mg tablet Take 25 mg by mouth daily      Turmeric 500 MG CAPS Take by mouth daily      Xiidra 5 % op solution Administer 1 drop to both eyes daily      omeprazole (PriLOSEC) 20 mg delayed release capsule Take 20 mg by mouth daily (Patient not taking: No sig reported)       Current Facility-Administered Medications   Medication Dose Route Frequency Provider Last Rate Last Admin    denosumab (PROLIA) subcutaneous injection 60 mg  60 mg Subcutaneous Q6 Months Jennifer Trinh MD   60 mg at 06/27/22 1008    denosumab (PROLIA) subcutaneous injection 60 mg  60 mg Subcutaneous Q6 Months John Turner MD   60 mg at 12/29/22 1208     She is allergic to shellfish-derived products - food allergy and povidone iodine. .    Review of Systems  No PMB, breast, bladder, bowel changes. No new persistent pain, bloating, early satiety or pelvic pressure  +difficulty with wiping after bowel movement    Objective:No PMB, breast, bladder, bowel changes.  No new persistent pain, bloating, early satiety or pelvic pressure  +fecal smearing      /70   Wt 59.4 kg (131 lb)   BMI 23.21 kg/m²          Physical Exam    General appearance: no distress, pleasant  Neck: thyroid without nodules or thyromegaly, no palpable adenopathy  Lymph nodes: no palpable adenopathy  Breasts: no masses, nodes or skin changes  Abdomen: soft, non tender, no palpable masses  Pelvic exam: normal atrophic external genitalia, urethral meatus normal, vagina atrophic without lesions, cervix atrophic without lesions, uterus small, non tender, no adnexal masses, non tender  Rectal exam: poor sphincter tone, no masses, RV confirms above

## 2023-12-11 NOTE — ASSESSMENT & PLAN NOTE
26 yo grandson committed suicide 3 months ago, struggling. Takes sertraline, has seen counselor. Grief discussed, encouraged.

## 2023-12-11 NOTE — ASSESSMENT & PLAN NOTE
66 yo , last seen 2021, here with complaints of fecal smearing after soft bowel movements. Takes Miralax for constipation. No other breast or gyn concerns. Normal breast and pelvic exams with poor sphincter tone. Last pap neg , no further indicated. Last mammo 23  Dexa with endocrine, on Prolia  Colonoscopy , ?2018    Recommend switching to fiber for regulation which may help with difficult cleaning.    Kegel/pelvic floor exercises discussed  RTO for Medicare well check in spring

## 2023-12-11 NOTE — LETTER
2023     Jens Henriquez MD  [address]    Patient: Yoseph Campo   YOB: 1945   Date of Visit: 2023       Dear Dr. Carolina Downs:    Johnna Chente was in to see me today for evaluation. Below are my notes for this consultation. If you have questions, please do not hesitate to call me. I look forward to following your patient along with you. Sincerely,        Lola Kennedy MD        CC: No Recipients    Lola Kennedy MD  2023 10:59 AM  Sign when Signing Visit  Assessment/Plan:    Fecal smearing  64 yo , last seen 2021, here with complaints of fecal smearing after soft bowel movements. Takes Miralax for constipation. No other breast or gyn concerns. Normal breast and pelvic exams with poor sphincter tone. Last pap neg , no further indicated. Last mammo 23  Dexa with endocrine, on Prolia  Colonoscopy , ?2018    Recommend switching to fiber for regulation which may help with difficult cleaning. Kegel/pelvic floor exercises discussed  RTO for Medicare well check in spring    Grief  24 yo grandson committed suicide 3 months ago, struggling. Takes sertraline, has seen counselor. Grief discussed, encouraged. There are no diagnoses linked to this encounter. Subjective:      Patient ID: Yoseph Campo is a 65 y.o. female. HPI here with concerns with stooling. The following portions of the patient's history were reviewed and updated as appropriate: She  has a past medical history of Anxiety, Fusion of spine, Osteopenia, Osteoporosis, and Squamous cell skin cancer ().   She   Patient Active Problem List    Diagnosis Date Noted   • Fecal smearing 2023   • Grief 2023   • Prediabetes 2023   • Encounter for gynecological examination (general) (routine) without abnormal findings 2021   • Osteoporosis 2021   • Abnormal thyroid function test 2020   • Other osteoporosis without current pathological fracture 05/13/2020   • Tachycardia 05/13/2020     She  has a past surgical history that includes Oophorectomy (Right); Spine surgery (2015); Lumbar fusion (Bilateral, 03/03/2020); DXA procedure(historical) (2019); Bladder repair (2008); Ovarian cyst removal (Left, 1962); Endometrial biopsy (2017); Colonoscopy (2008); Mammo (historical) (2012); and Spinal fusion (2020). Her family history includes COPD in her father; Heart failure in her mother; No Known Problems in her brother, brother, daughter, maternal aunt, maternal grandfather, maternal grandmother, paternal grandfather, and paternal grandmother; Thyroid disease unspecified in her daughter. She  reports that she has never smoked. She has never used smokeless tobacco. She reports current alcohol use of about 2.0 standard drinks of alcohol per week. She reports that she does not use drugs.   Current Outpatient Medications   Medication Sig Dispense Refill   • ALPRAZolam (XANAX) 1 mg tablet Take 1 mg by mouth daily at bedtime as needed for anxiety     • azelastine (ASTELIN) 0.1 % nasal spray every 24 hours     • buPROPion (WELLBUTRIN XL) 150 mg 24 hr tablet Take 150 mg by mouth in the morning     • calcium citrate (CALCITRATE) 950 (200 Ca) MG tablet Take 1 tablet by mouth daily     • cholecalciferol (VITAMIN D3) 1,000 units tablet Take 2,000 Units by mouth daily     • denosumab (Prolia) 60 mg/mL      • glucosamine-chondroitin 500-400 MG tablet Take 2 tablets by mouth in the morning     • lidocaine (LIDODERM) 5 % Apply topically daily (as directed)     • metoprolol succinate (TOPROL-XL) 25 mg 24 hr tablet Take 25 mg by mouth daily     • Probiotic Product (PROBIOTIC-10 PO) Take by mouth daily     • rosuvastatin (CRESTOR) 5 mg tablet every 24 hours     • sertraline (ZOLOFT) 25 mg tablet Take 25 mg by mouth daily     • Turmeric 500 MG CAPS Take by mouth daily     • Xiidra 5 % op solution Administer 1 drop to both eyes daily     • omeprazole (PriLOSEC) 20 mg delayed release capsule Take 20 mg by mouth daily (Patient not taking: No sig reported)       Current Facility-Administered Medications   Medication Dose Route Frequency Provider Last Rate Last Admin   • denosumab (PROLIA) subcutaneous injection 60 mg  60 mg Subcutaneous Q6 Months Yamilka Hannah MD   60 mg at 06/27/22 1008   • denosumab (PROLIA) subcutaneous injection 60 mg  60 mg Subcutaneous Q6 Months Corine Peralta MD   60 mg at 12/29/22 1208     She is allergic to shellfish-derived products - food allergy and povidone iodine. .    Review of Systems  No PMB, breast, bladder, bowel changes. No new persistent pain, bloating, early satiety or pelvic pressure  +difficulty with wiping after bowel movement    Objective:No PMB, breast, bladder, bowel changes.  No new persistent pain, bloating, early satiety or pelvic pressure  +fecal smearing      /70   Wt 59.4 kg (131 lb)   BMI 23.21 kg/m²          Physical Exam    General appearance: no distress, pleasant  Neck: thyroid without nodules or thyromegaly, no palpable adenopathy  Lymph nodes: no palpable adenopathy  Breasts: no masses, nodes or skin changes  Abdomen: soft, non tender, no palpable masses  Pelvic exam: normal atrophic external genitalia, urethral meatus normal, vagina atrophic without lesions, cervix atrophic without lesions, uterus small, non tender, no adnexal masses, non tender  Rectal exam: poor sphincter tone, no masses, RV confirms above

## 2023-12-13 ENCOUNTER — TELEPHONE (OUTPATIENT)
Dept: ENDOCRINOLOGY | Facility: HOSPITAL | Age: 78
End: 2023-12-13

## 2023-12-13 NOTE — TELEPHONE ENCOUNTER
Patient was called and made aware that her Prolia injection was too soon and that she may be responsible for a bill. I spoke with Dr. Ender Segal and she stated that the patient could wait until she comes back from [place]. She is now scheduled for May of next year. Pt did have blood work for this shot, but she will need more for her next injection.

## 2023-12-14 ENCOUNTER — HOSPITAL ENCOUNTER (OUTPATIENT)
Dept: BONE DENSITY | Facility: CLINIC | Age: 78
Discharge: HOME | End: 2023-12-14
Payer: COMMERCIAL

## 2023-12-14 VITALS — HEIGHT: 63 IN | WEIGHT: 130.95 LBS | BODY MASS INDEX: 23.2 KG/M2

## 2023-12-14 DIAGNOSIS — M81.8 OTHER OSTEOPOROSIS WITHOUT CURRENT PATHOLOGICAL FRACTURE: ICD-10-CM

## 2023-12-14 PROCEDURE — 77080 DXA BONE DENSITY AXIAL: CPT

## 2023-12-19 ENCOUNTER — TELEPHONE (OUTPATIENT)
Dept: ENDOCRINOLOGY | Facility: HOSPITAL | Age: 78
End: 2023-12-19

## 2024-02-21 PROBLEM — Z01.419 ENCOUNTER FOR GYNECOLOGICAL EXAMINATION (GENERAL) (ROUTINE) WITHOUT ABNORMAL FINDINGS: Status: RESOLVED | Noted: 2021-12-27 | Resolved: 2024-02-21

## 2024-04-12 ENCOUNTER — TELEPHONE (OUTPATIENT)
Dept: ENDOCRINOLOGY | Facility: HOSPITAL | Age: 79
End: 2024-04-12

## 2024-04-12 NOTE — TELEPHONE ENCOUNTER
Patient called and stated she had her blood work done in December, and her calcium was 9.2. She stated that Dr. Diamond said that this blood work would be sufficient for her appointment. Please call patient back if there are concerns for her prolia injection.

## 2024-04-12 NOTE — TELEPHONE ENCOUNTER
Left message for call back. Patient is bringing her blood work to her injection on 4/19 but we need it before then. If her calcium level is not safe when she comes in with her labs we can't give her the Prolia injection.

## 2024-04-12 NOTE — TELEPHONE ENCOUNTER
The blood work that she is bringing with her is from December and her calcium at the time was 9.2. She said she called in December and was told that the blood work would be good for her April Injection.

## 2024-04-19 ENCOUNTER — CLINICAL SUPPORT (OUTPATIENT)
Dept: ENDOCRINOLOGY | Facility: HOSPITAL | Age: 79
End: 2024-04-19
Payer: COMMERCIAL

## 2024-04-19 VITALS — SYSTOLIC BLOOD PRESSURE: 120 MMHG | DIASTOLIC BLOOD PRESSURE: 78 MMHG | HEART RATE: 78 BPM

## 2024-04-19 DIAGNOSIS — M81.0 AGE RELATED OSTEOPOROSIS, UNSPECIFIED PATHOLOGICAL FRACTURE PRESENCE: Primary | ICD-10-CM

## 2024-04-19 PROCEDURE — 96372 THER/PROPH/DIAG INJ SC/IM: CPT

## 2024-04-19 NOTE — PROGRESS NOTES
Assessment/Plan:    Ana Pace came into the [facility] Endocrinology Office today 04/19/24 to receive her Prolia injection.      Verbal consent obtained.  Consent given by: patient    patient states patient has been medically healthy with no underlining concerns/complications.      Ana Pace presents with no symptoms today.       All insturctions were reviewed with the patient.    If the patient should have any questions/concerns, advised patient to contacted [facility] Office.       Subjective:     History provided by: patient    Patient ID: Ana Pace is a 78 y.o. female      Objective:    Vitals:    04/19/24 1015   BP: 120/78   Pulse: 78       Patient tolerated the injection well without any complications.  Injection site/s Left Arm.  Medication was provided by the office.    Patient signed consent form yes   Patient signed ABN form yes (If no patient is not a medicare patient).   Patient waited 15 minutes after injection no (This only applies to patient's receiving first time injection).       Last Visit: 4/12/2024  Next visit:Visit date not found

## 2024-09-26 ENCOUNTER — TELEPHONE (OUTPATIENT)
Age: 79
End: 2024-09-26

## 2024-09-26 NOTE — TELEPHONE ENCOUNTER
Orders will need to be re placed. Correct me if I am wrong she will be too soon for her Prolia on 10/8/24?

## 2024-09-26 NOTE — TELEPHONE ENCOUNTER
Patient calling to let us know she has an upcoming appt. On 10/25 for her prolia injection and is asking if Dr. Diamond wants labs done prior? Please advise.

## 2024-09-27 DIAGNOSIS — M81.0 AGE RELATED OSTEOPOROSIS, UNSPECIFIED PATHOLOGICAL FRACTURE PRESENCE: Primary | ICD-10-CM

## 2024-09-27 DIAGNOSIS — R94.6 ABNORMAL THYROID FUNCTION TEST: ICD-10-CM

## 2024-09-30 NOTE — TELEPHONE ENCOUNTER
Patient called stating she has a prolia injection and office visit on 10/8. I informed her it was too soon for her to have her prolia injection on 10/8. She is asking if her prolia injection and f/u with Dr. Diamond can be moved until after 10/20 when she can get her injection? She doesn't want to make 2 trips if she does not have too. Please advise.

## 2024-10-01 NOTE — TELEPHONE ENCOUNTER
Patient called- she didn't realize she has a bus trip planned on 10/31 so she is unable to come for the Prolia injection or the f/u with Dr. Diamond.    Attempted to transfer to [facility], unable to establish connection at this time, please call patient at [phone].    She did not want to wait to reschedule both appointments because she is fine with coming twice, so I was able to reschedule her f/u for the next available. She will be coming 11/12 at 3 pm - [place] office with Dr. Diamond.     She will need her Prolia injection rescheduled yet, please advise.

## 2024-10-03 NOTE — TELEPHONE ENCOUNTER
Patient calling back, she states she is already scheduled for her Prolia injection on 10/24 at 10:30AM.

## 2024-10-08 ENCOUNTER — TELEPHONE (OUTPATIENT)
Age: 79
End: 2024-10-08

## 2024-10-08 NOTE — TELEPHONE ENCOUNTER
Patient called in said that she is at [facility] now to have her labs done and they do not have the orders.  Asks that orders be faxed to them at [phone].  Faxed.

## 2024-10-09 LAB
ALBUMIN SERPL-MCNC: 4 G/DL (ref 3.6–5.1)
ALBUMIN/GLOB SERPL: 1.8 (CALC) (ref 1–2.5)
ALP SERPL-CCNC: 45 U/L (ref 37–153)
ALT SERPL-CCNC: 25 U/L (ref 6–29)
AST SERPL-CCNC: 21 U/L (ref 10–35)
BILIRUB SERPL-MCNC: 0.4 MG/DL (ref 0.2–1.2)
BUN SERPL-MCNC: 18 MG/DL (ref 7–25)
BUN/CREAT SERPL: ABNORMAL (CALC) (ref 6–22)
CALCIUM SERPL-MCNC: 9.2 MG/DL (ref 8.6–10.4)
CHLORIDE SERPL-SCNC: 102 MMOL/L (ref 98–110)
CO2 SERPL-SCNC: 32 MMOL/L (ref 20–32)
CREAT SERPL-MCNC: 0.98 MG/DL (ref 0.6–1)
GFR/BSA.PRED SERPLBLD CYS-BASED-ARV: 59 ML/MIN/1.73M2
GLOBULIN SER CALC-MCNC: 2.2 G/DL (CALC) (ref 1.9–3.7)
GLUCOSE SERPL-MCNC: 90 MG/DL (ref 65–99)
POTASSIUM SERPL-SCNC: 4.3 MMOL/L (ref 3.5–5.3)
PROT SERPL-MCNC: 6.2 G/DL (ref 6.1–8.1)
SODIUM SERPL-SCNC: 141 MMOL/L (ref 135–146)
T4 FREE SERPL-MCNC: 1 NG/DL (ref 0.8–1.8)
TSH SERPL-ACNC: 1.76 MIU/L (ref 0.4–4.5)

## 2024-10-24 ENCOUNTER — CLINICAL SUPPORT (OUTPATIENT)
Dept: ENDOCRINOLOGY | Facility: HOSPITAL | Age: 79
End: 2024-10-24
Payer: COMMERCIAL

## 2024-10-24 DIAGNOSIS — M81.0 AGE RELATED OSTEOPOROSIS, UNSPECIFIED PATHOLOGICAL FRACTURE PRESENCE: Primary | ICD-10-CM

## 2024-10-24 PROCEDURE — 96372 THER/PROPH/DIAG INJ SC/IM: CPT

## 2024-10-24 NOTE — PROGRESS NOTES
Assessment/Plan:    Ana Pace came into the [facility] Endocrinology Office today 10/24/24 to receive a Prolia injection.      Verbal consent obtained.  Consent given by: patient    Patient states patient has been medically healthy with no underlining concerns/complications.      Aan Pace presents with no symptoms today.       All insturctions were reviewed with the patient.    If the patient should have any questions/concerns, advised patient to contacted [facility] Endocr[facility] Office.       Subjective:     History provided by: patient    Patient ID: Ana Pace is a 78 y.o. female      Objective:    There were no vitals filed for this visit.    Patient tolerated the injection well without any complications.  Injection site/s left arm.  Medication was provided by office.    Patient signed consent form yes  Patient signed ABN form yes (If no patient is not a medicare patient).   Patient waited 15 minutes after injection no  (This only applies to patient's receiving first time injection).       Last Visit: 10/8/2024  Next visit:11/12/2024

## 2024-11-12 ENCOUNTER — OFFICE VISIT (OUTPATIENT)
Dept: ENDOCRINOLOGY | Facility: HOSPITAL | Age: 79
End: 2024-11-12
Payer: COMMERCIAL

## 2024-11-12 VITALS
DIASTOLIC BLOOD PRESSURE: 76 MMHG | BODY MASS INDEX: 24.1 KG/M2 | HEART RATE: 84 BPM | OXYGEN SATURATION: 96 % | HEIGHT: 63 IN | SYSTOLIC BLOOD PRESSURE: 122 MMHG | WEIGHT: 136 LBS

## 2024-11-12 DIAGNOSIS — R73.03 PREDIABETES: ICD-10-CM

## 2024-11-12 DIAGNOSIS — M81.8 OTHER OSTEOPOROSIS WITHOUT CURRENT PATHOLOGICAL FRACTURE: Primary | ICD-10-CM

## 2024-11-12 DIAGNOSIS — R94.6 ABNORMAL THYROID FUNCTION TEST: ICD-10-CM

## 2024-11-12 PROCEDURE — 99214 OFFICE O/P EST MOD 30 MIN: CPT | Performed by: INTERNAL MEDICINE

## 2024-11-12 RX ORDER — DESVENLAFAXINE 50 MG/1
TABLET, FILM COATED, EXTENDED RELEASE ORAL EVERY 24 HOURS
COMMUNITY

## 2024-11-12 RX ORDER — CALCIUM CARBONATE 300MG(750)
TABLET,CHEWABLE ORAL DAILY
COMMUNITY

## 2024-11-12 NOTE — PROGRESS NOTES
11/15/2024    Assessment & Plan      Diagnoses and all orders for this visit:    Other osteoporosis without current pathological fracture  -     Discontinue: denosumab (PROLIA) subcutaneous injection 60 mg  -     Comprehensive metabolic panel; Future  -     Comprehensive metabolic panel  -     Comprehensive metabolic panel; Future  -     T4, free; Future  -     TSH, 3rd generation; Future  -     Hemoglobin A1C; Future  -     Comprehensive metabolic panel  -     T4, free  -     TSH, 3rd generation    Abnormal thyroid function test  -     Comprehensive metabolic panel; Future  -     T4, free; Future  -     TSH, 3rd generation; Future  -     Hemoglobin A1C; Future  -     Comprehensive metabolic panel  -     T4, free  -     TSH, 3rd generation    Prediabetes  -     Comprehensive metabolic panel; Future  -     Comprehensive metabolic panel  -     Comprehensive metabolic panel; Future  -     T4, free; Future  -     TSH, 3rd generation; Future  -     Hemoglobin A1C; Future  -     Comprehensive metabolic panel  -     T4, free  -     TSH, 3rd generation    Other orders  -     desvenlafaxine succinate (PRISTIQ) 50 mg 24 hr tablet; every 24 hours  -     Magnesium 400 MG TABS; Take by mouth in the morning        Assessment & Plan  1. Osteoporosis.  The last DEXA scan in December 2023 demonstrated stable osteoporosis with no worsening or improvement. She will continue Prolia injection 60 mg twice a year, vitamin D 2000 units daily, and calcium 900 to 1000 mg a day along with magnesium 400 mg daily. She will continue to work on weightbearing exercises and try to increase her walking to 4 days a week. She will continue to keep hydrated.    2. Prediabetes.  She is experiencing increased thirst and dizziness but has no other symptoms of hyperglycemia. She reportedly had blood work done by her primary care physician showing a higher hemoglobin A1c, but those results are not available. She will continue to work on increasing exercise  and maintaining a healthy diet while efforts are made to obtain the blood work results from her primary care physician.    3. Abnormal thyroid studies.  Most recent thyroid function studies are normal, and she is biochemically euthyroid. These will be monitored over time.    Follow-up  The patient will follow up in 6 months with preceding CMP with our medical assistant and for repeat Prolia injection. She will follow up in 1 year with the provider with a preceding CMP, TSH, free T4, and hemoglobin A1c, and for Prolia injection. She will be due for her next DEXA scan in December 2025, which can be ordered at her next visit.        CC: Osteoporosis, prediabetes, thyroid follow-up      History of Present Illness    HPI: Ana Pace is a 78-year-old female with a history of osteoporosis, prediabetes, and abnormal thyroid studies, here for a follow-up visit.    She had been postmenopausal for 25 years prior to 2020 and was on estrogen for 3 to 4 years. She was on both Fosamax and Boniva for a total of 6 years on bisphosphonates. She has been receiving Prolia injections since December 2020.  Last DEXA scan was December 2023.    She does have a history of abnormal thyroid studies with a free T4 that is mildly low with a normal TSH.  Subsequent T4 levels have normalized.     She has a history of hyperglycemia and prediabetes.    She reports frequent urination, including once during the night, and increased thirst. She consumes a significant amount of water daily. She does not experience excessive hunger but admits to feeling slightly dizzy and lightheaded. She has not had any numbness or tingling in her mouth, fingers, or toes, and has not experienced any muscle twitches or spasms. There is no blurry vision reported.    She often feels cold and occasionally experiences sweating. She does not experience heart racing or palpitations and takes medication for this. She does not have any tremors. She takes magnesium to  maintain regular bowel movements.    She sleeps for 7 hours at night but feels tired during the day. She has gained weight, currently weighing 136 pounds, up from 128 or 129 pounds. She walks for 45 minutes three times a week. She has fallen a few times due to her hip giving out but has not sustained any fractures.        Historical Information   Past Medical History:   Diagnosis Date    Anxiety     Fusion of spine     Osteopenia     Osteoporosis     managed by endocrinology, on Prolia    Squamous cell skin cancer 2011    lip      Past Surgical History:   Procedure Laterality Date    BLADDER REPAIR  2008    vaginal    COLONOSCOPY  2008    DXA PROCEDURE (HISTORICAL)  2019    ENDOMETRIAL BIOPSY  2017    LUMBAR FUSION Bilateral 03/03/2020    L4-L5    MAMMO (HISTORICAL)  2012    OOPHORECTOMY Right     age 44    OVARIAN CYST REMOVAL Left 1962    SPINAL FUSION  2020    SPINE SURGERY  2015    Lumbar laminectomy      Social History   Social History     Substance and Sexual Activity   Alcohol Use Yes    Alcohol/week: 2.0 standard drinks of alcohol    Types: 2 Glasses of wine per week    Comment: 5-6 a week     Social History     Substance and Sexual Activity   Drug Use Never     Social History     Tobacco Use   Smoking Status Never   Smokeless Tobacco Never     Family History:   Family History   Problem Relation Age of Onset    Heart failure Mother     COPD Father     No Known Problems Daughter     Thyroid disease unspecified Daughter     No Known Problems Maternal Grandmother     No Known Problems Maternal Grandfather     No Known Problems Paternal Grandmother     No Known Problems Paternal Grandfather     No Known Problems Brother     No Known Problems Brother     No Known Problems Maternal Aunt     Breast cancer Neg Hx     Uterine cancer Neg Hx     Ovarian cancer Neg Hx     Colon cancer Neg Hx        Meds/Allergies   Current Outpatient Medications   Medication Sig Dispense Refill    Magnesium 400 MG TABS Take by mouth in  "the morning      ALPRAZolam (XANAX) 1 mg tablet Take 1 mg by mouth daily at bedtime as needed for anxiety      buPROPion (WELLBUTRIN XL) 150 mg 24 hr tablet Take 150 mg by mouth in the morning      calcium citrate (CALCITRATE) 950 (200 Ca) MG tablet Take 1 tablet by mouth daily      cholecalciferol (VITAMIN D3) 1,000 units tablet Take 2,000 Units by mouth daily      denosumab (Prolia) 60 mg/mL       desvenlafaxine succinate (PRISTIQ) 50 mg 24 hr tablet every 24 hours      metoprolol succinate (TOPROL-XL) 25 mg 24 hr tablet Take 25 mg by mouth daily      rosuvastatin (CRESTOR) 5 mg tablet every 24 hours       Current Facility-Administered Medications   Medication Dose Route Frequency Provider Last Rate Last Admin    denosumab (PROLIA) subcutaneous injection 60 mg  60 mg Subcutaneous Q6 Months Luann Nicholas MD   60 mg at 04/19/24 1016     Allergies   Allergen Reactions    Shellfish-Derived Products - Food Allergy Anaphylaxis and Swelling    Povidone Iodine Rash       Objective   Vitals: Blood pressure 122/76, pulse 84, height 5' 3\" (1.6 m), weight 61.7 kg (136 lb), SpO2 96%.  Invasive Devices       None                   Physical Exam    No lid lag, stare, proptosis, or periorbital edema in the eyes. Negative Chv[place]ek sign.  Thyroid is normal in size. No palpable thyroid nodules.  Lungs are clear to auscultation.  Heart has a regular rate and rhythm. No murmurs.  No tremor of the outstretched hands. No lower extremity edema. No spinous process tenderness. No CVA tenderness. Patellar deep tendon reflexes are normal.      The history was obtained from the review of the chart and from the patient.    Lab Results:      Blood work performed on 10/8/2024 showed a CMP with a glucose of 90, calcium 9.2 with an albumin of 4 and GFR 59 but was otherwise normal.    Lab Results   Component Value Date    CREATININE 0.98 10/08/2024    CREATININE 0.82 05/21/2020    BUN 18 10/08/2024    K 4.3 10/08/2024     10/08/2024    CO2 " 32 10/08/2024     eGFR   Date Value Ref Range Status   10/08/2024 59 (L) > OR = 60 mL/min/1.73m2 Final         Lab Results   Component Value Date    ALT 25 10/08/2024    AST 21 10/08/2024    ALKPHOS 45 10/08/2024       Lab Results   Component Value Date    TSH 1.76 10/08/2024    FREET4 1.0 10/08/2024     DEXA scan:    DXA SCAN performed on 12/14/2023     CLINICAL HISTORY: 77 years postmenopausal female.  OTHER RISK FACTORS: PPI therapy, SSRI therapy.     PHARMACOLOGIC THERAPY FOR OSTEOPOROSIS: Prolia.     TECHNIQUE: Bone densitometry was performed using a Hologic Discovery C bone densitometer.  Regions of interest appear properly placed.        COMPARISON: 12/13/2021.     RESULTS:     LUMBAR SPINE: Not assessed because the presence of spinal hardware results in fewer than two evaluable vertebrae.     LEFT  TOTAL HIP:  BMD: 0.848 gm/cm2  T-score: -0.8     LEFT  FEMORAL NECK:  BMD: 0.720 gm/cm2  T score: -1.2     LEFT  FOREARM:  33% RADIUS BMD: 0.571 gm/cm2  T-score: -1.9        IMPRESSION:     1. Low bone mass (osteopenia).     2.  Since a DXA study from 12/13/2021, there has been:  No statistically significant change in bone mineral density at any of the evaluated skeletal sites.           3.  The 10 year risk of hip fracture is 2.2% with the 10 year risk of major osteoporotic fracture being 11% as calculated by the [employer] fracture risk assessment tool (FRAX, which is based on data generated by the WHO Collaborating [place]   for Metabolic Bone Diseases).     4.  The current NOF guidelines recommend treating patients with a T-score of -2.5 or less in the lumbar spine or hips, or in post-menopausal women and men over the age of 50 with low bone mass (osteopenia) and a FRAX 10 year risk score of >3% for hip   fracture and/or >20% for major osteoporotic fracture.        Future Appointments   Date Time Provider Department Center   5/9/2025 11:00 AM ENDOCRINOLOGY NURSE [facility]    11/17/2025  9:40 AM Susan Diamond MD [facility]   11/17/2025 10:00 AM ENDOCRINOLOGY NURSE [facility]

## 2024-11-12 NOTE — PATIENT INSTRUCTIONS
We'll try to get blood work from the PCP.     For now, all the rest of the blood work is good.     Continue the calcium, vitamin D, magnesium.    Continue to keep active and try to increase to 4 days a week walking.     Continue the prolia injections every 6 months.     Follow up in 6 month with the MA for prolia injection and with blood work.     Follow up in 1 year with MD and with blood work and for prolia injection.     Dexa scan due in December 2025.

## 2025-01-08 ENCOUNTER — HOSPITAL ENCOUNTER (OUTPATIENT)
Dept: MAMMOGRAPHY | Facility: CLINIC | Age: 80
Discharge: HOME | End: 2025-01-08
Payer: COMMERCIAL

## 2025-01-08 VITALS — HEIGHT: 63 IN | BODY MASS INDEX: 24.1 KG/M2 | WEIGHT: 136 LBS

## 2025-01-08 DIAGNOSIS — Z12.31 ENCOUNTER FOR SCREENING MAMMOGRAM FOR MALIGNANT NEOPLASM OF BREAST: ICD-10-CM

## 2025-01-08 PROCEDURE — 77063 BREAST TOMOSYNTHESIS BI: CPT

## 2025-01-08 PROCEDURE — 77067 SCR MAMMO BI INCL CAD: CPT

## 2025-01-30 ENCOUNTER — TELEPHONE (OUTPATIENT)
Age: 80
End: 2025-01-30

## 2025-01-30 NOTE — TELEPHONE ENCOUNTER
Received a call from the pt to confirm her May Prolia injection and then went over the dates for November - she will be in [place] as her granddaughter is getting . She will be leaving around November 14 and returning by December 1st. If you can call her and reschedule both her appt with Dr. Diamond and the Prolia injection. Called the office and spoke with Amelia who will reach out to the pt.

## 2025-04-17 ENCOUNTER — TELEPHONE (OUTPATIENT)
Dept: ENDOCRINOLOGY | Facility: HOSPITAL | Age: 80
End: 2025-04-17

## 2025-04-17 NOTE — TELEPHONE ENCOUNTER
Please obtain prior authorization for Prolia.    Benefits received, patient needs prior authorization, has a $900 deductible, 20% OOP and $35 admin fee. This will need to be discussed with her once we have authorization. Previously she did not have a deductible.

## 2025-04-23 NOTE — TELEPHONE ENCOUNTER
Message left for patient to call back. She may want to speak with her insurance as she did not have a deductible previously. She needs to be aware of the OOP cost.

## 2025-04-23 NOTE — TELEPHONE ENCOUNTER
Pt calling back to advise she spoke with insurance $400 pt would have to cover. Its a tier 4 drug. And then a $100 copay.     Please advise.

## 2025-04-23 NOTE — TELEPHONE ENCOUNTER
Patient returned call. She has been made aware of the messages above. She is going to call her insurance and then call us back once she speaks with them.

## 2025-04-24 DIAGNOSIS — M81.8 OTHER OSTEOPOROSIS WITHOUT CURRENT PATHOLOGICAL FRACTURE: Primary | ICD-10-CM

## 2025-04-24 NOTE — TELEPHONE ENCOUNTER
Spoke with patient's . They are agreeable to proceeding with Prolia. Last lab work was December 2024 which [facility] is faxing over. Would you need more recent lab results prior to her injection?

## 2025-05-09 ENCOUNTER — CLINICAL SUPPORT (OUTPATIENT)
Dept: ENDOCRINOLOGY | Facility: HOSPITAL | Age: 80
End: 2025-05-09
Payer: COMMERCIAL

## 2025-05-09 ENCOUNTER — TELEPHONE (OUTPATIENT)
Dept: ENDOCRINOLOGY | Facility: HOSPITAL | Age: 80
End: 2025-05-09

## 2025-05-09 DIAGNOSIS — M81.8 OTHER OSTEOPOROSIS WITHOUT CURRENT PATHOLOGICAL FRACTURE: Primary | ICD-10-CM

## 2025-05-09 PROCEDURE — 96372 THER/PROPH/DIAG INJ SC/IM: CPT

## 2025-05-09 NOTE — TELEPHONE ENCOUNTER
I called spouse. He has a copy of her labs drawn last week. He is bringing a copy with him but states her calcium is 9.5 and Albumin is 4.1.     Per verbal from Dr Diamond, she is good for her Prolia.

## 2025-05-09 NOTE — PROGRESS NOTES
Assessment/Plan:    Ana Pace came into the [facility] today 05/09/25 to receive Prolia injection.      Verbal consent obtained.  Consent given by: patient    patient states patient has been medically healthy with no underlining concerns/complications.      Ana Pace presents with no symptoms today.       All insturctions were reviewed with the patient.    If the patient should have any questions/concerns, advised patient to contacted [facility].       Subjective:     History provided by: patient    Patient ID: Ana Pace is a 79 y.o. female      Objective:    There were no vitals filed for this visit.    Patient tolerated the injection well without any complications.  Injection site/s left arm.  Medication was provided by office supply.    Patient signed consent form yes   Patient signed ABN form yes (If no patient is not a medicare patient).   Patient waited 15 minutes after injection no (This only applies to patient's receiving first time injection).       Last Visit: 5/9/2025  Next visit:Visit date not found

## 2025-06-03 ENCOUNTER — PROCEDURE VISIT (OUTPATIENT)
Dept: OBGYN CLINIC | Facility: CLINIC | Age: 80
End: 2025-06-03
Payer: COMMERCIAL

## 2025-06-03 VITALS
HEIGHT: 64 IN | WEIGHT: 136.2 LBS | SYSTOLIC BLOOD PRESSURE: 130 MMHG | BODY MASS INDEX: 23.25 KG/M2 | DIASTOLIC BLOOD PRESSURE: 68 MMHG

## 2025-06-03 DIAGNOSIS — N76.4 LABIAL ABSCESS: Primary | ICD-10-CM

## 2025-06-03 PROCEDURE — 99214 OFFICE O/P EST MOD 30 MIN: CPT | Performed by: OBSTETRICS & GYNECOLOGY

## 2025-06-03 RX ORDER — AZELASTINE HYDROCHLORIDE 137 UG/1
SPRAY, METERED NASAL
COMMUNITY
Start: 2025-05-16

## 2025-06-03 RX ORDER — CEPHALEXIN 500 MG/1
500 CAPSULE ORAL EVERY 12 HOURS SCHEDULED
Qty: 14 CAPSULE | Refills: 0 | Status: SHIPPED | OUTPATIENT
Start: 2025-06-03 | End: 2025-06-09 | Stop reason: SDUPTHER

## 2025-06-03 NOTE — PROGRESS NOTES
"[facility] - [address], [address], [place] [address]    Assessment & Plan  Labial abscess  Labial folliculitis vs abscess that has not resolved with supportive measures by patient.  On exam 1cm area that is not draining.  Does not feel like there is fluctuance that would respond to I&D.  Will give Keflex bid for 1 week.  Recom continue compresses.    Discussed avoiding tight clothing, synthetic underwear, panty liners.  Allow area to breath at night with no underwear.      Orders:    cephalexin (KEFLEX) 500 mg capsule; Take 1 capsule (500 mg total) by mouth every 12 (twelve) hours for 7 days      I have spent a total time of 20 minutes in caring for this patient on the day of the visit/encounter including Diagnostic results, Prognosis, Risks and benefits of tx options, Instructions for management, Risk factor reductions, Impressions, Counseling / Coordination of care, Documenting in the medical record, Reviewing/placing orders in the medical record (including tests, medications, and/or procedures), and Obtaining or reviewing history  .      Subjective:   Ana Pace is a 79 y.o.  female.    HPI:   Loreta present for cyst or sore on left labia.  States has had for last 6-7 days.  Using warm compresses and soaks and feels maybe smaller but hasn't drained or resolved.  She has had in past but always resolved with soaks.  \"I think it comes from tight jeans and panty liners.\"  No vaginal bleeding or discharge.    No fever, but c/o laryngitis symptoms she is seeing PCP tomorrow.        Gyn History  No LMP recorded. Patient is postmenopausal.       Last pap smear: 2021    She  reports that she is not currently sexually active and has had partner(s) who are male. She reports using the following method of birth control/protection: Post-menopausal.       OB History      Past Medical History:  No date: Anxiety  No date: Fusion of spine  No date: Osteopenia  No date: " "Osteoporosis      Comment:  managed by endocrinology, on Prolia  2011: Squamous cell skin cancer      Comment:  lip      Past Surgical History:  2008: BLADDER REPAIR      Comment:  vaginal  2008: COLONOSCOPY  2019: DXA PROCEDURE (HISTORICAL)  2017: ENDOMETRIAL BIOPSY  03/03/2020: LUMBAR FUSION; Bilateral      Comment:  L4-L5  2012: MAMMO (HISTORICAL)  No date: OOPHORECTOMY; Right      Comment:  age 44  1962: OVARIAN CYST REMOVAL; Left  2020: SPINAL FUSION  2015: SPINE SURGERY      Comment:  Lumbar laminectomy      Social History[1]     Current Medications[2]    She is allergic to shellfish-derived products - food allergy and povidone iodine..    ROS: Review of Systems   Constitutional: Negative.    Gastrointestinal: Negative.    Genitourinary:  Positive for vaginal pain (labial cyst/sore).   Psychiatric/Behavioral: Negative.         Objective:  /68 (BP Location: Right arm, Patient Position: Sitting, Cuff Size: Standard)   Ht 5' 3.5\" (1.613 m)   Wt 61.8 kg (136 lb 3.2 oz)   BMI 23.75 kg/m²      Physical Exam  Constitutional:       Appearance: Normal appearance.   Genitourinary:     General: Normal vulva.      Labia:         Left: Lesion present.       Vagina: Normal.      Cervix: Normal.      Uterus: Normal. Not enlarged and not tender.       Adnexa:         Right: No mass or tenderness.          Left: No mass or tenderness.        Rectum: No external hemorrhoid.        Comments: 1cm folliculitis vs labial abscess on lower left labia majora - erythema in raised area but not surrounding, no fluctuance felt    Neurological:      Mental Status: She is alert.     Psychiatric:         Mood and Affect: Mood normal.         Behavior: Behavior normal.                [1]   Social History  Tobacco Use    Smoking status: Never    Smokeless tobacco: Never   Vaping Use    Vaping status: Never Used   Substance Use Topics    Alcohol use: Yes     Alcohol/week: 2.0 standard drinks of alcohol     Types: 2 Glasses of wine per " week     Comment: 5-6 a week    Drug use: Never   [2]   Current Outpatient Medications:     ALPRAZolam (XANAX) 1 mg tablet, Take 1 mg by mouth daily at bedtime as needed for anxiety, Disp: , Rfl:     Azelastine HCl 137 MCG/SPRAY SOLN, USE 2 SPRAYS IN EACH NOSTRIL TWICE A DAY AS NEEDED, Disp: , Rfl:     buPROPion (WELLBUTRIN XL) 150 mg 24 hr tablet, Take 150 mg by mouth in the morning, Disp: , Rfl:     calcium citrate (CALCITRATE) 950 (200 Ca) MG tablet, Take 1 tablet by mouth in the morning., Disp: , Rfl:     cholecalciferol (VITAMIN D3) 1,000 units tablet, Take 2,000 Units by mouth in the morning., Disp: , Rfl:     denosumab (Prolia) 60 mg/mL, , Disp: , Rfl:     Magnesium 400 MG TABS, Take by mouth in the morning, Disp: , Rfl:     metoprolol succinate (TOPROL-XL) 25 mg 24 hr tablet, Take 25 mg by mouth in the morning., Disp: , Rfl:     rosuvastatin (CRESTOR) 5 mg tablet, every 24 hours, Disp: , Rfl:     desvenlafaxine succinate (PRISTIQ) 50 mg 24 hr tablet, every 24 hours (Patient not taking: Reported on 6/3/2025), Disp: , Rfl:     Current Facility-Administered Medications:     denosumab (PROLIA) subcutaneous injection 60 mg, 60 mg, Subcutaneous, Q6 Months, Luann Nicholas MD, 60 mg at 04/19/24 1016

## 2025-06-06 ENCOUNTER — NURSE TRIAGE (OUTPATIENT)
Age: 80
End: 2025-06-06

## 2025-06-06 DIAGNOSIS — N76.4 LABIAL ABSCESS: ICD-10-CM

## 2025-06-06 NOTE — TELEPHONE ENCOUNTER
Pt calling in and is notified of the providers recommendations, pt verbalized understanding no further questions.

## 2025-06-06 NOTE — TELEPHONE ENCOUNTER
"REASON FOR CONVERSATION: Vaginal Problem    SYMPTOMS: dime-sized labial cyst    OTHER HEALTH INFORMATION: patient reports she was seen in the office for this left labial cyst on Tuesday.  It was not lanced or I&D'd but she is taking Keflex and using warm moist compresses.  She denies pain or draining. She is wondering if it is normal that it has not changed in size.    PROTOCOL DISPOSITION: Discuss with Provider and Call Back Patient    CARE ADVICE PROVIDED: Continue warm moist compresses, take Ibuprofen or Tylenol if it becomes pain, Instructed to call back with new/worsening symptoms or change in symptoms, fevers, severe pain or bleeding or discharge from cyst.      PRACTICE FOLLOW-UP: none        Answer Assessment - Initial Assessment Questions  1. SYMPTOM: \"What's the main symptom you're concerned about?\" (e.g., pain, itching, dryness)      Labial abscess still present  2. LOCATION: \"Where is the  symptoms located?\" (e.g., inside/outside, left/right)      left labia, about nickel sized  3. ONSET: \"When did the  symptoms  start?\"      10 days  4. PAIN: \"Is there any pain?\" If Yes, ask: \"How bad is it?\" (Scale: 1-10; mild, moderate, severe)      no  5. ITCHING: \"Is there any itching?\" If Yes, ask: \"How bad is it?\" (Scale: 1-10; mild, moderate, severe)      no  6. CAUSE: \"What do you think is causing the discharge?\" \"Have you had the same problem before?\" \"What happened then?\"      abscess  7. OTHER SYMPTOMS: \"Do you have any other symptoms?\" (e.g., fever, itching, vaginal bleeding, pain with urination, injury to genital area, vaginal foreign body)      denies    Protocols used: Vaginal Symptoms-Adult-[place]    "

## 2025-06-06 NOTE — TELEPHONE ENCOUNTER
If not increasing, that is good sign.  If not smaller by Monday - make appt for office exam on Tuesday to reevaluate.

## 2025-06-09 RX ORDER — CEPHALEXIN 500 MG/1
500 CAPSULE ORAL EVERY 12 HOURS SCHEDULED
Qty: 14 CAPSULE | Refills: 0 | Status: SHIPPED | OUTPATIENT
Start: 2025-06-09 | End: 2025-06-16

## 2025-06-09 NOTE — TELEPHONE ENCOUNTER
Patient calling in to advise abscess is not gone yet but is smaller.  She advised she has 1 more day of antibiotics.  She was advised to continue to monitor, give it a few more days and call back on Wed if not gone.

## 2025-06-10 NOTE — TELEPHONE ENCOUNTER
Left message per communication consent making patient aware another course of antibiotic sent and to call back worsens or does not resolve by Wednesday per previous message. Offered call back for any further questions or concerns at [phone].